# Patient Record
Sex: FEMALE | Race: BLACK OR AFRICAN AMERICAN | Employment: UNEMPLOYED | URBAN - METROPOLITAN AREA
[De-identification: names, ages, dates, MRNs, and addresses within clinical notes are randomized per-mention and may not be internally consistent; named-entity substitution may affect disease eponyms.]

---

## 2017-01-03 ENCOUNTER — TELEPHONE (OUTPATIENT)
Dept: FAMILY MEDICINE CLINIC | Age: 57
End: 2017-01-03

## 2017-01-31 DIAGNOSIS — E55.9 VITAMIN D DEFICIENCY: Primary | ICD-10-CM

## 2017-01-31 DIAGNOSIS — E78.5 HYPERLIPIDEMIA, UNSPECIFIED HYPERLIPIDEMIA TYPE: ICD-10-CM

## 2017-01-31 RX ORDER — EZETIMIBE 10 MG/1
10 TABLET ORAL DAILY
Qty: 30 TAB | Refills: 5 | Status: SHIPPED | OUTPATIENT
Start: 2017-01-31 | End: 2017-10-02 | Stop reason: SDUPTHER

## 2017-01-31 RX ORDER — ERGOCALCIFEROL 1.25 MG/1
50000 CAPSULE ORAL
Qty: 4 CAP | Refills: 2 | Status: SHIPPED | OUTPATIENT
Start: 2017-01-31 | End: 2017-10-03

## 2017-02-03 NOTE — TELEPHONE ENCOUNTER
Notes Recorded by Dolores Cornelius LPN on 3/1/9781 at 8:24 AM  Patient returned call, adv her of zetia and lipitor in tandem with each other. Adv of vitamin d Rx as well as taking with food and vitamin D rich foods. Patient/ Caller given an opportunity to ask questions, repeated information, and verbalized understanding.

## 2017-03-06 ENCOUNTER — HOSPITAL ENCOUNTER (OUTPATIENT)
Dept: MAMMOGRAPHY | Age: 57
Discharge: HOME | End: 2017-03-06
Attending: FAMILY MEDICINE

## 2017-03-06 DIAGNOSIS — Z12.31 VISIT FOR SCREENING MAMMOGRAM: ICD-10-CM

## 2017-07-31 ENCOUNTER — TELEPHONE (OUTPATIENT)
Dept: FAMILY MEDICINE CLINIC | Age: 57
End: 2017-07-31

## 2017-07-31 NOTE — TELEPHONE ENCOUNTER
[phone] spoke to patient needs prescription for Nicotine patches, advised needs office visit patient understand will call me back patient needs to look at her schedule

## 2017-10-02 ENCOUNTER — OFFICE VISIT (OUTPATIENT)
Dept: FAMILY MEDICINE CLINIC | Age: 57
End: 2017-10-02

## 2017-10-02 VITALS
TEMPERATURE: 97.7 F | RESPIRATION RATE: 18 BRPM | DIASTOLIC BLOOD PRESSURE: 81 MMHG | HEART RATE: 81 BPM | BODY MASS INDEX: 30.61 KG/M2 | SYSTOLIC BLOOD PRESSURE: 128 MMHG | HEIGHT: 67 IN | OXYGEN SATURATION: 99 % | WEIGHT: 195 LBS

## 2017-10-02 DIAGNOSIS — Z23 ENCOUNTER FOR IMMUNIZATION: ICD-10-CM

## 2017-10-02 DIAGNOSIS — E03.9 ACQUIRED HYPOTHYROIDISM: ICD-10-CM

## 2017-10-02 DIAGNOSIS — E66.9 OBESITY, CLASS I, BMI 30-34.9: ICD-10-CM

## 2017-10-02 DIAGNOSIS — Z72.0 TOBACCO ABUSE: ICD-10-CM

## 2017-10-02 DIAGNOSIS — G47.9 SLEEP DISTURBANCE: ICD-10-CM

## 2017-10-02 DIAGNOSIS — G89.29 CHRONIC BILATERAL LOW BACK PAIN WITHOUT SCIATICA: ICD-10-CM

## 2017-10-02 DIAGNOSIS — Z12.11 COLON CANCER SCREENING: ICD-10-CM

## 2017-10-02 DIAGNOSIS — G89.29 CHRONIC LEFT SHOULDER PAIN: ICD-10-CM

## 2017-10-02 DIAGNOSIS — E55.9 VITAMIN D DEFICIENCY: ICD-10-CM

## 2017-10-02 DIAGNOSIS — M25.512 CHRONIC LEFT SHOULDER PAIN: ICD-10-CM

## 2017-10-02 DIAGNOSIS — Z12.31 VISIT FOR SCREENING MAMMOGRAM: ICD-10-CM

## 2017-10-02 DIAGNOSIS — I10 ESSENTIAL HYPERTENSION: Primary | ICD-10-CM

## 2017-10-02 DIAGNOSIS — E78.5 HYPERLIPIDEMIA, UNSPECIFIED HYPERLIPIDEMIA TYPE: ICD-10-CM

## 2017-10-02 DIAGNOSIS — F32.A DEPRESSION, UNSPECIFIED DEPRESSION TYPE: ICD-10-CM

## 2017-10-02 DIAGNOSIS — F41.9 ANXIETY: ICD-10-CM

## 2017-10-02 DIAGNOSIS — M54.50 CHRONIC BILATERAL LOW BACK PAIN WITHOUT SCIATICA: ICD-10-CM

## 2017-10-02 DIAGNOSIS — J30.1 CHRONIC ALLERGIC RHINITIS DUE TO POLLEN, UNSPECIFIED SEASONALITY: ICD-10-CM

## 2017-10-02 RX ORDER — IBUPROFEN 200 MG
1 TABLET ORAL EVERY 24 HOURS
Qty: 30 PATCH | Refills: 0 | Status: SHIPPED | OUTPATIENT
Start: 2017-10-02 | End: 2017-10-17 | Stop reason: SDUPTHER

## 2017-10-02 RX ORDER — LEVOTHYROXINE SODIUM 100 UG/1
100 TABLET ORAL
Qty: 90 TAB | Refills: 3 | Status: SHIPPED | OUTPATIENT
Start: 2017-10-02 | End: 2018-11-12 | Stop reason: SDUPTHER

## 2017-10-02 RX ORDER — LISINOPRIL 20 MG/1
20 TABLET ORAL DAILY
Qty: 30 TAB | Refills: 5 | Status: SHIPPED | OUTPATIENT
Start: 2017-10-02 | End: 2018-04-26 | Stop reason: SDUPTHER

## 2017-10-02 RX ORDER — EZETIMIBE 10 MG/1
10 TABLET ORAL DAILY
Qty: 30 TAB | Refills: 5 | Status: SHIPPED | OUTPATIENT
Start: 2017-10-02 | End: 2018-11-12 | Stop reason: SDUPTHER

## 2017-10-02 RX ORDER — ATORVASTATIN CALCIUM 80 MG/1
80 TABLET, FILM COATED ORAL DAILY
Qty: 30 TAB | Refills: 5 | Status: SHIPPED | OUTPATIENT
Start: 2017-10-02 | End: 2018-11-12 | Stop reason: SDUPTHER

## 2017-10-02 RX ORDER — NICOTINE 7MG/24HR
1 PATCH, TRANSDERMAL 24 HOURS TRANSDERMAL EVERY 24 HOURS
Qty: 30 PATCH | Refills: 0 | Status: SHIPPED | OUTPATIENT
Start: 2017-11-02 | End: 2017-12-02

## 2017-10-02 RX ORDER — DULOXETIN HYDROCHLORIDE 30 MG/1
30 CAPSULE, DELAYED RELEASE ORAL DAILY
Qty: 30 CAP | Refills: 5 | Status: SHIPPED | OUTPATIENT
Start: 2017-10-02 | End: 2018-11-12 | Stop reason: SDUPTHER

## 2017-10-02 NOTE — MR AVS SNAPSHOT
Visit Information Date & Time Provider Department Dept. Phone Encounter #  
 10/2/2017  1:45 PM Sandrita Alvarado MD [facility] [phone] [id number] Follow-up Instructions Return in about 6 months (around 4/2/2018), or if symptoms worsen or fail to improve, for hypertension follow up, hyperlipidemia follow up. Upcoming Health Maintenance Date Due DTaP/Tdap/Td series (1 - Tdap) 10/8/1981 PAP AKA CERVICAL CYTOLOGY 10/8/1981 BREAST CANCER SCRN MAMMOGRAM 3/24/2017 FOBT Q 1 YEAR, 18+ 6/27/2017 Allergies as of 10/2/2017  Review Complete On: 10/2/2017 By: Asa Pencil, LPN Severity Noted Reaction Type Reactions Pcn [Penicillins] High 03/24/2010   Systemic Hives, Shortness of Breath, Itching Bactrim [Sulfamethoprim Ds]  10/14/2010    Unable to Obtain Current Immunizations  Reviewed on 12/21/2016 Name Date Influenza Vaccine 10/2/2017 Not reviewed this visit You Were Diagnosed With   
  
 Codes Comments Essential hypertension    -  Primary ICD-10-CM: I10 
ICD-9-CM: 401.9 Tobacco abuse     ICD-10-CM: Z72.0 ICD-9-CM: 305.1 Acquired hypothyroidism     ICD-10-CM: E03.9 ICD-9-CM: 244.9 Hyperlipidemia, unspecified hyperlipidemia type     ICD-10-CM: E78.5 ICD-9-CM: 272.4 Obesity, Class I, BMI 30-34.9     ICD-10-CM: E66.9 ICD-9-CM: 278.00 Vitamin D deficiency     ICD-10-CM: E55.9 ICD-9-CM: 268.9 Encounter for immunization     ICD-10-CM: R85 ICD-9-CM: V03.89 Colon cancer screening     ICD-10-CM: Z12.11 ICD-9-CM: V76.51 Visit for screening mammogram     ICD-10-CM: Z12.31 
ICD-9-CM: V76.12 Chronic bilateral low back pain without sciatica     ICD-10-CM: M54.5, G89.29 ICD-9-CM: 724.2, 338.29 Anxiety     ICD-10-CM: F41.9 ICD-9-CM: 300.00 Depression, unspecified depression type     ICD-10-CM: F32.9 ICD-9-CM: 884 Chronic allergic rhinitis due to pollen, unspecified seasonality     ICD-10-CM: J30.1 ICD-9-CM: 477.0 Chronic left shoulder pain     ICD-10-CM: M25.512, G89.29 ICD-9-CM: 719.41, 338.29 Sleep disturbance     ICD-10-CM: G47.9 ICD-9-CM: 780.50 Vitals BP Pulse Temp Resp Height(growth percentile) Weight(growth percentile) 128/81 (BP 1 Location: Right arm, BP Patient Position: Sitting) 81 97.7 °F (36.5 °C) (Oral) 18 5' 7\" (1.702 m) 195 lb (88.5 kg) SpO2 BMI OB Status Smoking Status 99% 30.54 kg/m2 Hysterectomy Current Every Day Smoker Vitals History BMI and BSA Data Body Mass Index Body Surface Area 30.54 kg/m 2 2.05 m 2 Preferred Pharmacy Pharmacy Name [facility] [address] [place] [address], [address] [place] [address],[address] Floor [phone] Your Updated Medication List  
  
   
This list is accurate as of: 10/2/17  3:43 PM.  Always use your most recent med list.  
  
  
  
  
 atorvastatin 80 mg tablet Commonly known as:  LIPITOR Take 1 Tab by mouth daily. bipap machine kit  
by Does Not Apply route nightly. DULoxetine 30 mg capsule Commonly known as:  CYMBALTA Take 1 Cap by mouth daily. ergocalciferol 50,000 unit capsule Commonly known as:  ERGOCALCIFEROL Take 1 Cap by mouth every seven (7) days. ezetimibe 10 mg tablet Commonly known as:  Ada Boom Take 1 Tab by mouth daily. levothyroxine 100 mcg tablet Commonly known as:  SYNTHROID Take 1 Tab by mouth Daily (before breakfast). lisinopril 20 mg tablet Commonly known as:  Vowinckel Lights Take 1 Tab by mouth daily. Indications: hypertension  
  
 multivitamin tablet Commonly known as:  ONE A DAY Take 1 Tab by mouth daily. * nicotine 14 mg/24 hr patch Commonly known as:  NICODERM CQ  
1 Patch by TransDERmal route every twenty-four (24) hours for 30 days.   
  
 * nicotine 7 mg/24 hr  
Commonly known as:  Jia Dean  
 1 Patch by TransDERmal route every twenty-four (24) hours for 30 days. Start taking on:  2017 VITAMIN D3 1,000 unit tablet Generic drug:  cholecalciferol Take  by mouth daily. vitamin e 1,000 unit capsule Commonly known as:  E GEMS Take 1,000 Units by mouth daily. * Notice: This list has 2 medication(s) that are the same as other medications prescribed for you. Read the directions carefully, and ask your doctor or other care provider to review them with you. Prescriptions Sent to Pharmacy Refills  
 nicotine (NICODERM CQ) 14 mg/24 hr patch 0 Si Patch by TransDERmal route every twenty-four (24) hours for 30 days. Class: Normal  
 Pharmacy: [facility] [address] [place] [address] Ph #: [phone] Route: TransDERmal  
 nicotine (NICODERM CQ) 7 mg/24 hr 0 Starting on: 2017 Si Patch by TransDERmal route every twenty-four (24) hours for 30 days. Class: Normal  
 Pharmacy: [facility] [address] [place] [address],[address] [place] [address] Ph #: [phone] Route: TransDERmal  
 atorvastatin (LIPITOR) 80 mg tablet 5 Sig: Take 1 Tab by mouth daily. Class: Normal  
 Pharmacy: [facility] [address] [place] [address] Ph #: [phone] Route: Oral  
 lisinopril (PRINIVIL, ZESTRIL) 20 mg tablet 5 Sig: Take 1 Tab by mouth daily. Indications: hypertension Class: Normal  
 Pharmacy: [facility] [address] [place] [address],[address] [place] [address] Ph #: [phone] Route: Oral  
 levothyroxine (SYNTHROID) 100 mcg tablet 3 Sig: Take 1 Tab by mouth Daily (before breakfast). Class: Normal  
 Pharmacy: [facility] [address] [place] [address],[address] [place] [address] Ph #: [phone] Route: Oral  
 ezetimibe (ZETIA) 10 mg tablet 5 Sig: Take 1 Tab by mouth daily.   
 Class: Normal  
 Pharmacy: [facility] [address] [place] [address], [address] [facility] Ph #: [phone] Route: Oral  
 DULoxetine (CYMBALTA) 30 mg capsule 5 Sig: Take 1 Cap by mouth daily. Class: Normal  
 Pharmacy: [facility] [address] [place] [address], [address] [facility] Ph #: [phone] Route: Oral  
  
We Performed the Following CBC WITH AUTOMATED DIFF [64770 CPT(R)] INFLUENZA VIRUS VACCINE,(SEASONAL),SPLIT, IN INDIVIDS. >=3 YRS OF AGE, IM [35790 CPT(R)] LIPID PANEL [91099 CPT(R)] METABOLIC PANEL, COMPREHENSIVE [46177 CPT(R)] TSH 3RD GENERATION [54683 CPT(R)] Follow-up Instructions Return in about 6 months (around 4/2/2018), or if symptoms worsen or fail to improve, for hypertension follow up, hyperlipidemia follow up. To-Do List   
 10/02/2017 Imaging:  [facility] MAMMO BI SCREENING INCL CAD   
  
 10/02/2017 Imaging:  XR SHOULDER LT AP/LAT MIN 2 V   
  
 11/01/2017 Lab:  OCCULT BLOOD, IMMUNOASSAY (FIT) Patient Instructions Learning About Obesity What is obesity? Obesity means having so much body fat that your health is in danger. Having too much body fat can lead to type 2 diabetes, heart disease, high blood pressure, arthritis, sleep apnea, and stroke. Even if you don't feel bad now, think about these health risks. Do they seem like a good reason to start on a new path toward a healthier weight? Or do you have another personal, powerful reason for wanting to lose weight? Whatever it is, keep it in mind. It can be hard to change eating habits and exercise habits. But with your own reason and plan, you can do it. How do you know if your weight is in the obesity range? To know if your weight is in the obesity range, your doctor looks at your body mass index (BMI) and waist size. Your BMI is a number that is calculated from your weight and your height.  To figure your BMI for yourself, get a BMI table from your doctor or use an online tool, such as http://www.[place].[facility]/ on the [employer] of [employer]. What causes obesity? When you take in more calories than you burn off, you gain weight. How you eat, how active you are, and other things affect how your body uses calories and whether you gain weight. If you have family members who have too much body fat, you may have inherited a tendency to gain weight. And your family also helps form your eating and lifestyle habits, which can lead to obesity. Also, our busy lives make it harder to plan and cook healthy meals. For many of us, it's easier to reach for prepared foods, go out to eat, or go to the drive-through. But these foods are often high in saturated fat and calories. Portions are often too large. What can you do to reach a healthy weight? Focus on health, not diets. Diets are hard to stay on and don't work in the long run. It is very hard to stay with a diet that includes lots of big changes in your eating habits. Instead of a diet, focus on lifestyle changes that will improve your health and achieve the right balance of energy and calories. To lose weight, you need to burn more calories than you take in. You can do it by eating healthy foods in reasonable amounts and becoming more active, even a little bit every day. Making small changes over time can add up to a lot. Make a plan for change. Many people have found that naming their reasons for change and staying focused on their plan can make a big difference. Work with your doctor to create a plan that is right for you. · Ask yourself: Anastasiia Teixeira are my personal, most powerful reasons for wanting this change? What will my life look like when I've made the change? \" · Set your long-term goal. Make it specific, such as \"I will lose x pounds. \" 
· Break your long-term goal into smaller, short-term goals.  Make these small steps specific and within your reach, things you know you can do. These steps are what keep you going from day to day. How can you stay on your plan for change? Be ready. Choose to start during a time when there are few events that might trigger slip-ups, like holidays, social events, and high-stress periods. Decide on your first few steps. Most people have more success when they make small changes, one step at a time. For example, you might switch a daily candy bar to a piece of fruit, walk 10 minutes more, or add more vegetables to a meal. 
Line up your support people. Make sure you're not going to be alone as you make this change. Connect with people who understand how important it is to you. Ask family members and friends for help in keeping with your plan. And think about who could make it harder for you, and how to handle them. Try tracking. People who keep track of what they eat, feel, and do are better at losing weight. Try writing down things like: · What and how much you eat. · How you feel before and after each meal. 
· Details about each meal (like eating out or at home, eating alone, or with friends or family). · What you do to be active. Look and plan. As you track, look for patterns that you may want to change. Take note of: · When you eat and whether you skip meals. · How often you eat out. · How many fruits and vegetables you eat. · When you eat beyond feeling full. · When and why you eat for reasons other than being hungry. When you stray from your plan, don't get upset. Figure out what made you slip up and how you can fix it. Can you take medicines or have surgery to lose weight? Before your doctor will prescribe medicines or surgery, he or she will probably want you to be more active and follow your healthy eating plan for a period of time. These habits are key lifelong changes for managing your weight, with or without other medical treatment.  And these changes can help you avoid weight-related health problems. Follow-up care is a key part of your treatment and safety. Be sure to make and go to all appointments, and call your doctor if you are having problems. It's also a good idea to know your test results and keep a list of the medicines you take. Where can you learn more? Go to http://marielena-kaley.info/. Enter N111 in the search box to learn more about \"Learning About Obesity. \" Current as of: October 13, 2016 Content Version: 11.3 © 9975-5726 [employer]atch. Care instructions adapted under license by [employer] (which disclaims liability or warranty for this information). If you have questions about a medical condition or this instruction, always ask your healthcare professional. [address] any warranty or liability for your use of this information. Introducing [facility]! Dear Migdalia Russ: Thank you for requesting a Carweez account. Our records indicate that you have previously registered for a Carweez account but its currently inactive. Please call our Car[employer]ez support line at [phone]. Additional Information If you have questions, please visit the Frequently Asked Questions section of the Car[employer]ez website at [email or web address] Remember, Carweez is NOT to be used for urgent needs. For medical emergencies, dial 911. Now available from your iPhone and Android! Please provide this summary of care documentation to your next provider. Your primary care clinician is listed as Ne Pearson. If you have any questions after today's visit, please call [phone].

## 2017-10-02 NOTE — PROGRESS NOTES
Chief Complaint   Patient presents with    Nicotine Dependence     wanting rx for nicotine patches     Medication Refill    Depression    Immunization/Injection       Reviewed Record in preparation for visit and have obtained necessary documentation. Identified pt with two pt identifiers (Name @ )    Health Maintenance Due   Topic    DTaP/Tdap/Td series (1 - Tdap)    PAP AKA CERVICAL CYTOLOGY     BREAST CANCER SCRN MAMMOGRAM     FOBT Q 1 YEAR, 18+     INFLUENZA AGE 9 TO ADULT          1. Have you been to the ER, urgent care clinic since your last visit? Hospitalized since your last visit? No    2. Have you seen or consulted any other health care providers outside of the [address] [place] [address] since your last visit? Include any pap smears or colon screening.  No

## 2017-10-02 NOTE — PATIENT INSTRUCTIONS
Learning About Obesity  What is obesity? Obesity means having so much body fat that your health is in danger. Having too much body fat can lead to type 2 diabetes, heart disease, high blood pressure, arthritis, sleep apnea, and stroke. Even if you don't feel bad now, think about these health risks. Do they seem like a good reason to start on a new path toward a healthier weight? Or do you have another personal, powerful reason for wanting to lose weight? Whatever it is, keep it in mind. It can be hard to change eating habits and exercise habits. But with your own reason and plan, you can do it. How do you know if your weight is in the obesity range? To know if your weight is in the obesity range, your doctor looks at your body mass index (BMI) and waist size. Your BMI is a number that is calculated from your weight and your height. To figure your BMI for yourself, get a BMI table from your doctor or use an online tool, such as http://www.[employer].com/ on the [employer] of [employer]. What causes obesity? When you take in more calories than you burn off, you gain weight. How you eat, how active you are, and other things affect how your body uses calories and whether you gain weight. If you have family members who have too much body fat, you may have inherited a tendency to gain weight. And your family also helps form your eating and lifestyle habits, which can lead to obesity. Also, our busy lives make it harder to plan and cook healthy meals. For many of us, it's easier to reach for prepared foods, go out to eat, or go to the drive-through. But these foods are often high in saturated fat and calories. Portions are often too large. What can you do to reach a healthy weight? Focus on health, not diets. Diets are hard to stay on and don't work in the long run.  It is very hard to stay with a diet that includes lots of big changes in your eating habits. Instead of a diet, focus on lifestyle changes that will improve your health and achieve the right balance of energy and calories. To lose weight, you need to burn more calories than you take in. You can do it by eating healthy foods in reasonable amounts and becoming more active, even a little bit every day. Making small changes over time can add up to a lot. Make a plan for change. Many people have found that naming their reasons for change and staying focused on their plan can make a big difference. Work with your doctor to create a plan that is right for you. · Ask yourself: Sachin Carrillo are my personal, most powerful reasons for wanting this change? What will my life look like when I've made the change? \"  · Set your long-term goal. Make it specific, such as \"I will lose x pounds. \"  · Break your long-term goal into smaller, short-term goals. Make these small steps specific and within your reach, things you know you can do. These steps are what keep you going from day to day. How can you stay on your plan for change? Be ready. Choose to start during a time when there are few events that might trigger slip-ups, like holidays, social events, and high-stress periods. Decide on your first few steps. Most people have more success when they make small changes, one step at a time. For example, you might switch a daily candy bar to a piece of fruit, walk 10 minutes more, or add more vegetables to a meal.  Line up your support people. Make sure you're not going to be alone as you make this change. Connect with people who understand how important it is to you. Ask family members and friends for help in keeping with your plan. And think about who could make it harder for you, and how to handle them. Try tracking. People who keep track of what they eat, feel, and do are better at losing weight. Try writing down things like:  · What and how much you eat.   · How you feel before and after each meal.  · Details about each meal (like eating out or at home, eating alone, or with friends or family). · What you do to be active. Look and plan. As you track, look for patterns that you may want to change. Take note of:  · When you eat and whether you skip meals. · How often you eat out. · How many fruits and vegetables you eat. · When you eat beyond feeling full. · When and why you eat for reasons other than being hungry. When you stray from your plan, don't get upset. Figure out what made you slip up and how you can fix it. Can you take medicines or have surgery to lose weight? Before your doctor will prescribe medicines or surgery, he or she will probably want you to be more active and follow your healthy eating plan for a period of time. These habits are key lifelong changes for managing your weight, with or without other medical treatment. And these changes can help you avoid weight-related health problems. Follow-up care is a key part of your treatment and safety. Be sure to make and go to all appointments, and call your doctor if you are having problems. It's also a good idea to know your test results and keep a list of the medicines you take. Where can you learn more? Go to http://marielena-kaley.info/. Enter N111 in the search box to learn more about \"Learning About Obesity. \"  Current as of: October 13, 2016  Content Version: 11.3  © 0347-0938 [employer]. Care instructions adapted under license by [employer] (which disclaims liability or warranty for this information). If you have questions about a medical condition or this instruction, always ask your healthcare professional. [address] any warranty or liability for your use of this information.

## 2017-10-02 NOTE — PROGRESS NOTES
HISTORY OF PRESENT ILLNESS  Sylwia Escobar is a 64 y.o. female. Blood pressure 128/81, pulse 81, temperature 97.7 °F (36.5 °C), temperature source Oral, resp. rate 18, height 5' 7\" (1.702 m), weight 195 lb (88.5 kg), SpO2 99 %. Body mass index is 30.54 kg/(m^2). Chief Complaint   Patient presents with    Nicotine Dependence     wanting rx for nicotine patches     Medication Refill    Depression    Immunization/Injection        HPI   Sylwia Escobar 64 y.o. female  presents to the office today for follow up on chronic conditions. Hypertension: Bp at office today 128/81. Pt continues with Lisinopril 20mg daily. Bp control stable, continue current regimen. Pt states that she does not take it regularly at home. Pt reports that she has been under a lot of stress recently with class at night and taking care of her mother. Pt denies chest pain, palpitations, dizziness, shortness of breath. Chronic right hip pain: Pt reports that her pain is well controlled and is just stiff in the mornings. Pt reports that she will take 2 Extra strenght tylenols a day when she needs it with relief. Stable, continue current regimen. Redness in right ankle: Pt reports redness in right ankle and believes that it is related to vericose veins noticed 6 months ago. Pt denies any pain. Left shoulder pain: Pt reports that she has a burning, pulling, sticking pain in her left shoulder. Pt reports limited mobility in her shoulder and pain when sleeping. Pt reports that she has had some similar issues with her right shoulder and was told it could be her rotator cuff. I advised pt that I believe it is a partial rotator cuff tear. I advised pt that I will get an XR of her left shoulder to rule out anything abnormal.     Hyperlipidemia: Lipid panel on 12/21/16 notable for total cholesterol 337, , HDL 61, and triglycerides 193. Pt continues with Atorvastatin 80mg daily. Presumed stable, will assess levels today. Depression: Pt reports that she does not feel depressed just has increased stress. Pt has asked for a nicotine replacement. Pt reports that she smokes 4-6 cigarettes a day. Previously quit, but with new stresses has started again. Pt has asked for some nicotine patches to help her quit again. I advised pt to try the 14mg patches for 3 weeks and then the 7mg patches for another 3 weeks. Sleep apnea: Pt states that she uses her CPAP machine nightly. Pt reports some trouble falling asleep. I have advised pt on some sleep hygrine to help her fall asleep better without medications. Hypothyroidism: Pt's TSH levels were 0.333 on 12/22/16. Pt continues with Levothyroxine 100mcg daily. Presumed stable, will assess levels today. Health Maintenance: Pt has received influenza vaccine today in office. Pt reports some nasal congestion and believes that it is seasonal. I advised pt to try taking Flonase to help alleviate her symptoms. Pt's last Vitamin D levels were 11.7 on 12/22/17. Pt continues with 1000 units of Vitamin D daily. Presumed stable, will assess levels today. I advised pt that she is due for a mammogram. Pt completed a FIT test last year and states that she has no one that can take her to get a colonoscopy. I have advised pt to completed another FIT test to rule out fecal occult stool. Current Outpatient Prescriptions   Medication Sig Dispense Refill    nicotine (NICODERM CQ) 14 mg/24 hr patch 1 Patch by TransDERmal route every twenty-four (24) hours for 30 days. 30 Patch 0    [START ON 11/2/2017] nicotine (NICODERM CQ) 7 mg/24 hr 1 Patch by TransDERmal route every twenty-four (24) hours for 30 days. 30 Patch 0    atorvastatin (LIPITOR) 80 mg tablet Take 1 Tab by mouth daily. 30 Tab 5    lisinopril (PRINIVIL, ZESTRIL) 20 mg tablet Take 1 Tab by mouth daily. Indications: hypertension 30 Tab 5    levothyroxine (SYNTHROID) 100 mcg tablet Take 1 Tab by mouth Daily (before breakfast).  90 Tab 3    ezetimibe (ZETIA) 10 mg tablet Take 1 Tab by mouth daily. 30 Tab 5    DULoxetine (CYMBALTA) 30 mg capsule Take 1 Cap by mouth daily. 30 Cap 5    bipap machine kit by Does Not Apply route nightly.  vitamin e (E GEMS) 1,000 unit capsule Take 1,000 Units by mouth daily.  cholecalciferol (VITAMIN D3) 1,000 unit tablet Take  by mouth daily.  multivitamin (ONE A DAY) tablet Take 1 Tab by mouth daily.  ergocalciferol (ERGOCALCIFEROL) 50,000 unit capsule Take 1 Cap by mouth every seven (7) days. 4 Cap 2     Allergies   Allergen Reactions    Pcn [Penicillins] Hives, Shortness of Breath and Itching    Bactrim [Sulfamethoprim Ds] Unable to Obtain     Past Medical History:   Diagnosis Date    AC (acromioclavicular) joint bone spurs     Acid reflux     Anemia NEC     Arthritis     right arm     Cancer (HCC)     thyroid    Chronic pain     Depression     High cholesterol     Incontinence of urine     MDD (major depressive disorder)     Multiple thyroid nodules     Other ill-defined conditions(799.89)     right shoulder pain    Sleep apnea 2013    Thyroid disease      Past Surgical History:   Procedure Laterality Date    HX  SECTION      HX HERNIA REPAIR      HX HYSTERECTOMY       Family History   Problem Relation Age of Onset    Hypertension Mother    Jeff Kennedy Arthritis-rheumatoid Mother     Cancer Father      colon    Diabetes Father     Cancer Sister      breast cancer    Diabetes Sister     Thyroid Disease Sister      thyroid cancer    Hypertension Brother     Cancer Brother      colon    Cancer Sister      thyroid    Seizures Sister     Cancer Paternal Grandfather      prostate      Social History   Substance Use Topics    Smoking status: Current Every Day Smoker     Years: 30.00     Last attempt to quit: 2013    Smokeless tobacco: Never Used      Comment: 5 cigs per day    Alcohol use No        Review of Systems   Constitutional: Negative.   Negative for chills, fever, malaise/fatigue and weight loss. HENT: Positive for congestion. Eyes: Negative for blurred vision. Respiratory: Negative for shortness of breath and wheezing. Cardiovascular: Negative for chest pain and leg swelling. Gastrointestinal: Negative. Genitourinary: Negative. Musculoskeletal: Positive for joint pain (Right hip and left shoulder). Neurological: Negative for dizziness and headaches. Trouble falling asleep. Psychiatric/Behavioral: Negative for depression. Stressed   All other systems reviewed and are negative. Physical Exam   Constitutional: She is oriented to person, place, and time. She appears well-developed and well-nourished. No distress. HENT:   Head: Normocephalic and atraumatic. Nose: Rhinorrhea present. Eyes: Pupils are equal, round, and reactive to light. Neck: Trachea normal. Carotid bruit is not present. No thyromegaly present. Cardiovascular: Normal rate, regular rhythm, normal heart sounds and intact distal pulses. Exam reveals no gallop and no friction rub. No murmur heard. Pulmonary/Chest: Effort normal and breath sounds normal. No respiratory distress. She has no wheezes. She has no rales. Musculoskeletal: She exhibits no edema. Left shoulder: She exhibits decreased range of motion and pain. She exhibits normal strength. Loza Neer positive of left shoulder. Neurological: She is alert and oriented to person, place, and time. Skin: She is not diaphoretic. Psychiatric: Her behavior is normal. Judgment and thought content normal. Her mood appears anxious. She does not exhibit a depressed mood. Nursing note and vitals reviewed. ASSESSMENT and PLAN  Diagnoses and all orders for this visit:    1. Essential hypertension  -     CBC WITH AUTOMATED DIFF  -     METABOLIC PANEL, COMPREHENSIVE  -     lisinopril (PRINIVIL, ZESTRIL) 20 mg tablet; Take 1 Tab by mouth daily.  Indications: hypertension  - Bp control stable, continue current regimen. 2. Tobacco abuse  -     nicotine (NICODERM CQ) 14 mg/24 hr patch; 1 Patch by TransDERmal route every twenty-four (24) hours for 30 days. -     nicotine (NICODERM CQ) 7 mg/24 hr; 1 Patch by TransDERmal route every twenty-four (24) hours for 30 days. - The patient was counseled on the dangers of tobacco use, and was advised to quit. Reviewed strategies to maximize success, including removing cigarettes and smoking materials from environment. - I advised pt to try the 14mg patches for 3 weeks and then the 7mg patches for another 3 weeks. 3. Acquired hypothyroidism  -     TSH 3RD GENERATION  -     levothyroxine (SYNTHROID) 100 mcg tablet; Take 1 Tab by mouth Daily (before breakfast). - Pt's TSH levels were 0.333 on 12/22/16. Pt continues with Levothyroxine 100mcg daily. Presumed stable, will assess levels today. 4. Hyperlipidemia, unspecified hyperlipidemia type  -     LIPID PANEL  -     atorvastatin (LIPITOR) 80 mg tablet; Take 1 Tab by mouth daily. -     ezetimibe (ZETIA) 10 mg tablet; Take 1 Tab by mouth daily.  - Lipid panel on 12/21/16 notable for total cholesterol 337, , HDL 61, and triglycerides 193. Pt continues with Atorvastatin 80mg daily. Presumed stable, will assess levels today. 5. Obesity, Class I, BMI 30-34.9        - I have reviewed/discussed the above normal BMI with the patient. I have recommended the following interventions: dietary management education, guidance, and counseling, encourage exercise and monitor weight . 6. Vitamin D deficiency  -     VITAMIN D, 25 HYROXY PANEL  -  Pt's last Vitamin D levels were 11.7 on 12/22/17. Pt continues with 1000 units of Vitamin D daily. Presumed stable, will assess levels today. 7. Encounter for immunization  -     Influenza virus vaccine (SEASONAL) 3 years and older, IM (38762)    8. Colon cancer screening  -     OCCULT BLOOD, IMMUNOASSAY (FIT); Future    9.  Visit for screening mammogram  -     [facility] MAMMO BI SCREENING INCL CAD; Future    10. Chronic bilateral low back pain without sciatica  -     DULoxetine (CYMBALTA) 30 mg capsule; Take 1 Cap by mouth daily.  - Stable, continue current regimen. 11. Anxiety  -     DULoxetine (CYMBALTA) 30 mg capsule; Take 1 Cap by mouth daily.  - Stable, continue current regimen. 12. Depression, unspecified depression type  -     DULoxetine (CYMBALTA) 30 mg capsule; Take 1 Cap by mouth daily.  - Stable, continue current regimen. 13. Chronic allergic rhinitis due to pollen, unspecified seasonality         -  I advised pt to try taking Flonase or OTC allergy medications to help alleviate her symptoms. 14. Chronic left shoulder pain  -     XR SHOULDER LT AP/LAT MIN 2 V; Future        - I advised pt that I believe it is a partial rotator cuff tear. I advised pt that I will get an XR of her left shoulder to rule out anything abnormal.     15. Sleep disturbance        -  I have advised pt on some sleep hygrine to help her fall asleep better without medications. Follow-up Disposition:  Return in about 6 months (around 4/2/2018), or if symptoms worsen or fail to improve, for hypertension follow up, hyperlipidemia follow up. Medication risks/benefits/costs/interactions/alternatives discussed with patient. Advised patient to call back or return to office if symptoms worsen/change/persist.  If patient cannot reach us or should anything more severe/urgent arise he/she should proceed directly to the nearest emergency department. Discussed expected course/resolution/complications of diagnosis in detail with patient. Patient given a written after visit summary which includes her diagnoses, current medications and vitals. Patient expressed understanding with the diagnosis and plan.   Written by paulina Arteaga, as dictated by, Dr. Grisel Brown MD.

## 2017-10-03 ENCOUNTER — TELEPHONE (OUTPATIENT)
Dept: FAMILY MEDICINE CLINIC | Age: 57
End: 2017-10-03

## 2017-10-03 DIAGNOSIS — M25.512 ACUTE PAIN OF LEFT SHOULDER: Primary | ICD-10-CM

## 2017-10-03 NOTE — TELEPHONE ENCOUNTER
Patient is calling in regards to her most recent XRAY that was performed she would like a call back with the results as soon as possible.     Best call back # for [id number]:[phone]

## 2017-10-06 ENCOUNTER — TELEPHONE (OUTPATIENT)
Dept: FAMILY MEDICINE CLINIC | Age: 57
End: 2017-10-06

## 2017-10-06 NOTE — TELEPHONE ENCOUNTER
[phone] spoke to patient advised its the  from [facility] called her to set up her MRI and Mammogram gave her  [phone] to call them and set up appointment patient understand

## 2017-10-06 NOTE — TELEPHONE ENCOUNTER
----- Message from Shelton Virgen sent at 10/6/2017  2:23 PM EDT -----  Regarding: /Telephone  Would a nurse to return call    Pt was referred to get a MRI. A lady by the name of Annamarie Mejia called her but the number Hellenomarmasood Roberto left is out of service. The pt would like the contact information and name of where she is to have the MRI done, and would also like to know the two test that was ordered.      Best contact is [phone]

## 2017-10-17 ENCOUNTER — TELEPHONE (OUTPATIENT)
Dept: FAMILY MEDICINE CLINIC | Age: 57
End: 2017-10-17

## 2017-10-17 DIAGNOSIS — Z72.0 TOBACCO ABUSE: ICD-10-CM

## 2017-10-17 NOTE — TELEPHONE ENCOUNTER
Spoke to patient per patient she is using nicotine 7 mg she was supposed to get 14 mg but she was given 7 mg    [phone] called [facility] to verify per Jah Sim didn't get the prescription for Nicotine 14 mg they only got Nicotine 7 mg can you resend Nicotine 14 mg

## 2017-10-17 NOTE — TELEPHONE ENCOUNTER
[phone] per Migdalia Russ 5-10 per day before nicotine, after using nicotine 7 mg she's smoke 10 per day now because nicotine 7 mg not working pharmacy gave her Nicotine 7 instead of 14 mg

## 2017-10-17 NOTE — TELEPHONE ENCOUNTER
[phone] spoke to patient per patient Nicotine patches not working for wants to know if she can increase it or need another medication.

## 2017-10-17 NOTE — TELEPHONE ENCOUNTER
----- Message from Cristy Diaz sent at 10/17/2017  9:52 AM EDT -----  Regarding: /telephone  Pt is requesting a call back from the practice in regards to RX (nicotene patches). Best contact [phone].

## 2017-10-19 RX ORDER — IBUPROFEN 200 MG
1 TABLET ORAL EVERY 24 HOURS
Qty: 30 PATCH | Refills: 2 | Status: SHIPPED | OUTPATIENT
Start: 2017-10-19 | End: 2017-11-18

## 2017-11-06 ENCOUNTER — APPOINTMENT (OUTPATIENT)
Dept: MRI IMAGING | Age: 57
End: 2017-11-06
Attending: FAMILY MEDICINE
Payer: MEDICARE

## 2017-11-06 ENCOUNTER — HOSPITAL ENCOUNTER (OUTPATIENT)
Dept: LAB | Age: 57
Discharge: HOME | End: 2017-11-06
Payer: MEDICARE

## 2017-11-06 ENCOUNTER — HOSPITAL ENCOUNTER (OUTPATIENT)
Dept: MAMMOGRAPHY | Age: 57
Discharge: HOME | End: 2017-11-06
Attending: FAMILY MEDICINE
Payer: MEDICARE

## 2017-11-06 DIAGNOSIS — Z12.11 COLON CANCER SCREENING: ICD-10-CM

## 2017-11-06 DIAGNOSIS — Z12.31 VISIT FOR SCREENING MAMMOGRAM: ICD-10-CM

## 2017-11-06 PROCEDURE — 84443 ASSAY THYROID STIM HORMONE: CPT

## 2017-11-06 PROCEDURE — 82306 VITAMIN D 25 HYDROXY: CPT

## 2017-11-06 PROCEDURE — 36415 COLL VENOUS BLD VENIPUNCTURE: CPT

## 2017-11-06 PROCEDURE — 85025 COMPLETE CBC W/AUTO DIFF WBC: CPT

## 2017-11-06 PROCEDURE — 80061 LIPID PANEL: CPT

## 2017-11-06 PROCEDURE — 80053 COMPREHEN METABOLIC PANEL: CPT

## 2017-11-06 PROCEDURE — 77067 SCR MAMMO BI INCL CAD: CPT

## 2017-11-06 PROCEDURE — 82270 OCCULT BLOOD FECES: CPT

## 2017-11-07 LAB — HEMOCCULT STL QL IA: POSITIVE

## 2017-11-08 ENCOUNTER — TELEPHONE (OUTPATIENT)
Dept: FAMILY MEDICINE CLINIC | Age: 57
End: 2017-11-08

## 2017-11-08 NOTE — PROGRESS NOTES
Pt FIT test positive  She needs to see GI ASAP  Family history of colon cancer    Refer to Dr. Iliana Lozada please

## 2017-11-08 NOTE — TELEPHONE ENCOUNTER
Gaby Bush  Best# [phone] after 3 pm Monday ( 11/13/2017) had MRI on left shoulder in a lot of pain to pioint I can not sleep. From Ear to my fingers.  Can he call me something in or do I have to come in Tylenol is not working

## 2017-11-08 NOTE — PROGRESS NOTES
[phone] verified  Patient notified of above note and voiced understanding of what was read.    Called Dr Iliana Lozada office [phone] had appointment with Dr Forbes Dubin 2017 at 10:45A but patient said she couldn't make it due to she has class  Next appointment 2017 with is a Thursday and per patient she can't do Thursday so Dr Iliana Lozada office advised me to let the patient call them because of scheduling [phone] notified patient to call Dr Iliana Lozada office directly patient understand

## 2017-11-10 LAB
25(OH)D2 SERPL-MCNC: 11 NG/ML
25(OH)D3 SERPL-MCNC: 27 NG/ML
25(OH)D3+25(OH)D2 SERPL-MCNC: 38 NG/ML
BASOPHILS # BLD AUTO: NORMAL 10*3/UL
EOSINOPHIL # BLD AUTO: NORMAL 10*3/UL
EOSINOPHIL NFR BLD AUTO: NORMAL %
HCT VFR BLD AUTO: NORMAL %
HGB BLD-MCNC: NORMAL G/DL
LYMPHOCYTES # BLD AUTO: NORMAL 10*3/UL
LYMPHOCYTES NFR BLD AUTO: NORMAL %
MONOCYTES NFR BLD AUTO: NORMAL %
NEUTROPHILS NFR BLD AUTO: NORMAL %
PLATELET # BLD AUTO: NORMAL 10*3/UL
RBC # BLD AUTO: NORMAL 10*6/UL
WBC # BLD AUTO: NORMAL X10E3/UL

## 2017-11-13 ENCOUNTER — TELEPHONE (OUTPATIENT)
Dept: FAMILY MEDICINE CLINIC | Age: 57
End: 2017-11-13

## 2017-11-13 ENCOUNTER — HOSPITAL ENCOUNTER (OUTPATIENT)
Dept: MRI IMAGING | Age: 57
Discharge: HOME | End: 2017-11-13
Attending: FAMILY MEDICINE

## 2017-11-13 DIAGNOSIS — M25.512 ACUTE PAIN OF LEFT SHOULDER: ICD-10-CM

## 2017-11-13 NOTE — TELEPHONE ENCOUNTER
Dot Malagon   -   [phone]        Patient went in  for a MRI got Claustrophobic and would not go thru with it-  Was offered a sedative but  Did not have anyone pick her up afterwards -  Requesting a call from nurse -

## 2017-11-14 RX ORDER — DIAZEPAM 5 MG/1
TABLET ORAL
Qty: 1 TAB | Refills: 0 | OUTPATIENT
Start: 2017-11-14 | End: 2019-10-02

## 2017-11-14 RX ORDER — DIAZEPAM 5 MG/1
5 TABLET ORAL
COMMUNITY
End: 2017-11-14 | Stop reason: SDUPTHER

## 2017-11-14 NOTE — TELEPHONE ENCOUNTER
[phone] notified patient will call in prescription for valium to take 1 hour prior to MRI procedure patient understand     [phone] [facility] called in prescription for valium 5 mg take 1 tablet by mouth 1 hour prior to MRI procedure via

## 2017-11-14 NOTE — TELEPHONE ENCOUNTER
[phone] spoke to patient she had to cancel MRI due to Claustrophobic but she wants it set up again for Friday since she will have transportation to take pick her/drop her off since they will give her sedative to calm her down for MRI. Patient can only do Friday 9-12 due to transportation      [facility] set up appointment for 12/1/2017 at 10:45AM     [phone] I advised Blance of the appointment I also advised her that will send message to Dr Kp Rodriguez to see if we can get her sedative to calm her down for MRI she's aware and will let her know.

## 2017-11-30 ENCOUNTER — TELEPHONE (OUTPATIENT)
Dept: FAMILY MEDICINE CLINIC | Age: 57
End: 2017-11-30

## 2017-11-30 NOTE — TELEPHONE ENCOUNTER
[phone] spoke to patient advised can take valium 1 hour prior to MRI and advised we can set her up with Jaimie Camacho to discuss her mental issues per patient will call back to set appointment

## 2017-12-01 ENCOUNTER — HOSPITAL ENCOUNTER (OUTPATIENT)
Dept: MRI IMAGING | Age: 57
Discharge: HOME | End: 2017-12-01
Attending: FAMILY MEDICINE
Payer: MEDICARE

## 2017-12-01 DIAGNOSIS — M25.512 PAIN IN LEFT SHOULDER: ICD-10-CM

## 2017-12-01 PROCEDURE — 73221 MRI JOINT UPR EXTREM W/O DYE: CPT

## 2017-12-03 NOTE — PROGRESS NOTES
Please inquire where the pts other labs are 2/5 have come back  Also she has blood in stool needs to see GI

## 2017-12-03 NOTE — PROGRESS NOTES
Impression             IMPRESSION:   1. Mild myotendinous strain of the lateral head of the deltoid  2.  Mild tendinopathy of the supraspinatus infraspinatus without full-thickness    Needs to have PT with Kolleen Cardinal  tear

## 2017-12-04 ENCOUNTER — TELEPHONE (OUTPATIENT)
Dept: FAMILY MEDICINE CLINIC | Age: 57
End: 2017-12-04

## 2017-12-04 DIAGNOSIS — M25.512 ACUTE PAIN OF LEFT SHOULDER: Primary | ICD-10-CM

## 2017-12-04 NOTE — TELEPHONE ENCOUNTER
----- Message from Angela Hartmann sent at 12/4/2017 12:40 PM EST -----  Regarding: Dr. Geovanna Cragi  Patient would like the results of her MRI. Her number is [phone] or [phone].

## 2017-12-04 NOTE — TELEPHONE ENCOUNTER
[phone] spoke to patient notified of her MRI results gave her PT information     Per patient can she get something for pain she can't sleep at night

## 2017-12-04 NOTE — TELEPHONE ENCOUNTER
----- Message from Constance Casanova MD sent at 12/2/2017  7:50 PM EST -----  Impression             IMPRESSION:   1. Mild myotendinous strain of the lateral head of the deltoid  2.  Mild tendinopathy of the supraspinatus infraspinatus without full-thickness    Needs to have PT with Girma Nageotte  tear

## 2017-12-05 NOTE — TELEPHONE ENCOUNTER
[phone] attempted to call patient no answer left message on her private VM of Dr Lima Ventura anti inflamatory motrin 400-600 mg every 6-8 hours as needed for pain x 7-10 days recommendation and to call me back if she has question

## 2017-12-05 NOTE — TELEPHONE ENCOUNTER
I would advice an anti inflamatory motrin 400-600 mg every 6-8 hours as needed for pain x7-10 days max

## 2017-12-22 ENCOUNTER — DOCUMENTATION ONLY (OUTPATIENT)
Dept: FAMILY MEDICINE CLINIC | Age: 57
End: 2017-12-22

## 2017-12-29 ENCOUNTER — APPOINTMENT (OUTPATIENT)
Dept: PHYSICAL THERAPY | Age: 57
End: 2017-12-29

## 2018-01-24 ENCOUNTER — TELEPHONE (OUTPATIENT)
Dept: FAMILY MEDICINE CLINIC | Age: 58
End: 2018-01-24

## 2018-01-24 NOTE — TELEPHONE ENCOUNTER
[phone] spoke to patient advised that she was refer to PT per patient she can't even move she's been dealing with pain needs something for pain tylenol and aleve is making her stomach upset advised patient will send message to Dr Kerman Simmonds

## 2018-01-24 NOTE — TELEPHONE ENCOUNTER
Patient advised that she is still having pain in her legs and her rotators cuff. She has been taking pain medicine (Tylenol and Aleve, but is medication is making her stomach upset. Would like a nurse to call her back. Best contact info [phone].

## 2018-01-26 NOTE — TELEPHONE ENCOUNTER
[phone] notified patient via private  notified needs to see pain management per Dr Weaver Catching Dr Kaylan Bull   [phone]

## 2018-05-14 DIAGNOSIS — I10 ESSENTIAL HYPERTENSION: ICD-10-CM

## 2018-05-14 RX ORDER — LISINOPRIL 20 MG/1
20 TABLET ORAL DAILY
Qty: 30 TAB | Refills: 0 | Status: CANCELLED | OUTPATIENT
Start: 2018-05-14

## 2018-05-14 NOTE — TELEPHONE ENCOUNTER
Refill 90 day    Requested Prescriptions     Pending Prescriptions Disp Refills    lisinopril (PRINIVIL, ZESTRIL) 20 mg tablet 30 Tab 0     Sig: Take 1 Tab by mouth daily.  Indications: hypertension

## 2018-05-14 NOTE — TELEPHONE ENCOUNTER
Called to patient. александр verified. Informed patient that I received a 90 day request for lisinopril but unfortunately she is overdue and we cannot give that much at this time. She states she will call to make an appointment when she knows her availability.

## 2018-05-15 NOTE — TELEPHONE ENCOUNTER
Called patient and lm that she needs to call us and make apt. Before further refills. Past due for apt.

## 2018-08-20 DIAGNOSIS — I10 ESSENTIAL HYPERTENSION: ICD-10-CM

## 2018-08-20 NOTE — TELEPHONE ENCOUNTER
Pharmacy requesting medication refill 90day supply     Requested Prescriptions     Pending Prescriptions Disp Refills    lisinopril (PRINIVIL, ZESTRIL) 20 mg tablet 30 Tab 0     Sig: Take 1 Tab by mouth daily.  Indications: hypertension     Pharmacy on file verified

## 2018-08-20 NOTE — TELEPHONE ENCOUNTER
LOV 10/2/2017  Patient was aware 5/14/2018 Mahesh Saucedo notified patient that needs office visit for 90 day supply

## 2018-08-23 RX ORDER — LISINOPRIL 20 MG/1
20 TABLET ORAL DAILY
Qty: 30 TAB | Refills: 0 | Status: SHIPPED | OUTPATIENT
Start: 2018-08-23 | End: 2018-10-31 | Stop reason: SDUPTHER

## 2018-10-18 ENCOUNTER — TELEPHONE (OUTPATIENT)
Dept: FAMILY MEDICINE CLINIC | Age: 58
End: 2018-10-18

## 2018-10-18 NOTE — TELEPHONE ENCOUNTER
Patient is calling in regards to getting an appointment but would like to talk with the nurse first. Also in regards to her mom. Patient would like a call back after 3 p.m. Today.     Best call back : [phone]    LOV: October 2, 2017

## 2018-10-18 NOTE — TELEPHONE ENCOUNTER
[phone] spoke to patient needs assistance with Mom Fernando Rojas advised her we need to see patient we have not seen here for 2 years Rere understand.  Rere wants to set up appointment for herself advised 11/12/2018 at 10:30A Rere will call me back to set up appointment for Mom later

## 2018-10-31 DIAGNOSIS — I10 ESSENTIAL HYPERTENSION: ICD-10-CM

## 2018-10-31 NOTE — TELEPHONE ENCOUNTER
Pharm on file verified. Last office visit 12/22/17  Last refill 08/23/18    Requested Prescriptions     Pending Prescriptions Disp Refills    lisinopril (PRINIVIL, ZESTRIL) 20 mg tablet 30 Tab 0     Sig: Take 1 Tab by mouth daily.

## 2018-11-04 RX ORDER — LISINOPRIL 20 MG/1
20 TABLET ORAL DAILY
Qty: 30 TAB | Refills: 0 | Status: SHIPPED | OUTPATIENT
Start: 2018-11-04 | End: 2018-12-27 | Stop reason: SDUPTHER

## 2018-11-12 ENCOUNTER — OFFICE VISIT (OUTPATIENT)
Dept: FAMILY MEDICINE CLINIC | Age: 58
End: 2018-11-12

## 2018-11-12 VITALS
DIASTOLIC BLOOD PRESSURE: 78 MMHG | SYSTOLIC BLOOD PRESSURE: 109 MMHG | WEIGHT: 189.6 LBS | BODY MASS INDEX: 29.76 KG/M2 | HEART RATE: 66 BPM | TEMPERATURE: 97.6 F | RESPIRATION RATE: 18 BRPM | OXYGEN SATURATION: 98 % | HEIGHT: 67 IN

## 2018-11-12 DIAGNOSIS — E66.3 OVERWEIGHT: ICD-10-CM

## 2018-11-12 DIAGNOSIS — J06.9 VIRAL URI: ICD-10-CM

## 2018-11-12 DIAGNOSIS — M54.50 CHRONIC BILATERAL LOW BACK PAIN WITHOUT SCIATICA: ICD-10-CM

## 2018-11-12 DIAGNOSIS — E03.9 ACQUIRED HYPOTHYROIDISM: ICD-10-CM

## 2018-11-12 DIAGNOSIS — E78.5 HYPERLIPIDEMIA, UNSPECIFIED HYPERLIPIDEMIA TYPE: Primary | ICD-10-CM

## 2018-11-12 DIAGNOSIS — Z13.31 SCREENING FOR DEPRESSION: ICD-10-CM

## 2018-11-12 DIAGNOSIS — F32.A DEPRESSION, UNSPECIFIED DEPRESSION TYPE: ICD-10-CM

## 2018-11-12 DIAGNOSIS — Z12.11 COLON CANCER SCREENING: ICD-10-CM

## 2018-11-12 DIAGNOSIS — Z00.00 ENCOUNTER FOR MEDICARE ANNUAL WELLNESS EXAM: ICD-10-CM

## 2018-11-12 DIAGNOSIS — G89.29 CHRONIC BILATERAL LOW BACK PAIN WITHOUT SCIATICA: ICD-10-CM

## 2018-11-12 DIAGNOSIS — F41.9 ANXIETY: ICD-10-CM

## 2018-11-12 RX ORDER — LEVOTHYROXINE SODIUM 100 UG/1
100 TABLET ORAL
Qty: 90 TAB | Refills: 1 | Status: SHIPPED | OUTPATIENT
Start: 2018-11-12 | End: 2019-02-15 | Stop reason: DRUGHIGH

## 2018-11-12 RX ORDER — ATORVASTATIN CALCIUM 80 MG/1
80 TABLET, FILM COATED ORAL DAILY
Qty: 30 TAB | Refills: 5 | Status: SHIPPED | OUTPATIENT
Start: 2018-11-12 | End: 2019-04-10 | Stop reason: SDUPTHER

## 2018-11-12 RX ORDER — EZETIMIBE 10 MG/1
10 TABLET ORAL DAILY
Qty: 30 TAB | Refills: 5 | Status: SHIPPED | OUTPATIENT
Start: 2018-11-12 | End: 2019-05-08 | Stop reason: SDUPTHER

## 2018-11-12 RX ORDER — DULOXETIN HYDROCHLORIDE 30 MG/1
30 CAPSULE, DELAYED RELEASE ORAL DAILY
Qty: 30 CAP | Refills: 5 | Status: SHIPPED | OUTPATIENT
Start: 2018-11-12 | End: 2019-10-02 | Stop reason: SDUPTHER

## 2018-11-12 NOTE — PROGRESS NOTES
This is the Subsequent Medicare Annual Wellness Exam, performed 12 months or more after the Initial AWV or the last Subsequent AWV I have reviewed the patient's medical history in detail and updated the computerized patient record. History Past Medical History:  
Diagnosis Date  AC (acromioclavicular) joint bone spurs  Acid reflux  Anemia NEC  Arthritis   
 right arm  Cancer ([facility] [address] thyroid  Chronic pain  Depression  High cholesterol  Incontinence of urine  MDD (major depressive disorder) 2013  Multiple thyroid nodules  Other ill-defined conditions(799.89) right shoulder pain  Sleep apnea 2013  Thyroid disease Past Surgical History:  
Procedure Laterality Date  HX BREAST BIOPSY Right 2009 US; neg [address]  HX HERNIA REPAIR    
 HX HYSTERECTOMY Current Outpatient Medications Medication Sig Dispense Refill  lisinopril (PRINIVIL, ZESTRIL) 20 mg tablet Take 1 Tab by mouth daily. 30 Tab 0  
 diazePAM (VALIUM) 5 mg tablet Take 5 mg 1 hour prior to MRI procedure Patient has MRI 12/1/2017 1 Tab 0  
 atorvastatin (LIPITOR) 80 mg tablet Take 1 Tab by mouth daily. 30 Tab 5  levothyroxine (SYNTHROID) 100 mcg tablet Take 1 Tab by mouth Daily (before breakfast). 90 Tab 3  
 ezetimibe (ZETIA) 10 mg tablet Take 1 Tab by mouth daily. 30 Tab 5  DULoxetine (CYMBALTA) 30 mg capsule Take 1 Cap by mouth daily. 30 Cap 5  
 bipap machine kit by Does Not Apply route nightly.  vitamin e (E GEMS) 1,000 unit capsule Take 1,000 Units by mouth daily.  cholecalciferol (VITAMIN D3) 1,000 unit tablet Take  by mouth daily.  multivitamin (ONE A DAY) tablet Take 1 Tab by mouth daily. Allergies Allergen Reactions  Pcn [Penicillins] Hives, Shortness of Breath and Itching  Bactrim [Sulfamethoprim Ds] Unable to [employer] Family History Problem Relation Age of Onset  Hypertension Mother  Arthritis-rheumatoid Mother  Cancer Father   
     colon  Diabetes Father  Cancer Sister   
     breast cancer  Diabetes Sister  Thyroid Disease Sister   
     thyroid cancer  Breast Cancer Sister 64  Hypertension Brother  Cancer Brother   
     colon  Cancer Sister   
     thyroid  Seizures Sister  Cancer Paternal Grandfather   
     prostate Social History Tobacco Use  Smoking status: Current Every Day Smoker Years: 30.00 Last attempt to quit: 2013 Years since quittin.4  Smokeless tobacco: Never Used  Tobacco comment: 5 cigs per day Substance Use Topics  Alcohol use: No  
 
Patient Active Problem List  
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  
 Anxiety disorder F41.9  Major depressive disorder F32.9  
 H/O papillary adenocarcinoma of thyroid Z85.850  Post-surgical hypothyroidism E89.0  Major depressive disorder, recurrent episode, moderate (HCC) F33.1  Sleep apnea G47.30  MDD (major depressive disorder) F32.9  Diffusion capacity of lung (dl), decreased R94.2  Right leg pain M79.604  Degeneration of lumbar or lumbosacral intervertebral disc M51.37  Chronic back pain M54.9, G89.29  Lumbar facet arthropathy M47.816  Lumbar degenerative disc disease M51.36  
 Obesity, Class I, BMI 30-34.9 E66.9  
 Essential hypertension I10  
 Acquired hypothyroidism E03.9 Depression Risk Factor Screening: PHQ over the last two weeks 2018 Little interest or pleasure in doing things Several days Feeling down, depressed, irritable, or hopeless More than half the days Total Score PHQ 2 3 Trouble falling or staying asleep, or sleeping too much More than half the days Feeling tired or having little energy Nearly every day Poor appetite, weight loss, or overeating Not at all Feeling bad about yourself - or that you are a failure or have let yourself or your family down Several days Trouble concentrating on things such as school, work, reading, or watching TV Not at all Moving or speaking so slowly that other people could have noticed; or the opposite being so fidgety that others notice Not at all Thoughts of being better off dead, or hurting yourself in some way Not at all PHQ 9 Score 9 How difficult have these problems made it for you to do your work, take care of your home and get along with others Very difficult Alcohol Risk Factor Screening: You do not drink alcohol or very rarely. Functional Ability and Level of Safety:  
Hearing Loss Hearing is good. Activities of Daily Living The home contains: no safety equipment. Patient does total self care Fall Risk Fall Risk Assessment, last 12 mths 11/12/2018 Able to walk? Yes Fall in past 12 months? No  
 
 
Abuse Screen Patient is not abused Cognitive Screening Evaluation of Cognitive Function: 
Has your family/caregiver stated any concerns about your memory: no 
 
 
Patient Care Team  
Patient Care Team: 
Tessa Marion MD as PCP - General 
Lexie Marcelino MD (Otolaryngology) Ankit Ramirez MD as Consulting Provider (Endocrinology) Assessment/Plan Education and counseling provided: 
Are appropriate based on today's review and evaluation Diagnoses and all orders for this visit: 
 
1. Overweight 2. Hyperlipidemia, unspecified hyperlipidemia type 
-     atorvastatin (LIPITOR) 80 mg tablet; Take 1 Tab by mouth daily. -     ezetimibe (ZETIA) 10 mg tablet; Take 1 Tab by mouth daily. 3. Acquired hypothyroidism 
-     levothyroxine (SYNTHROID) 100 mcg tablet;  Take 1 Tab by mouth Daily (before breakfast). 4. Chronic bilateral low back pain without sciatica 
-     DULoxetine (CYMBALTA) 30 mg capsule; Take 1 Cap by mouth daily. 5. Anxiety 
-     DULoxetine (CYMBALTA) 30 mg capsule; Take 1 Cap by mouth daily. 6. Depression, unspecified depression type -     DULoxetine (CYMBALTA) 30 mg capsule; Take 1 Cap by mouth daily. 7. Major depressive disorder, recurrent episode, moderate ([address] Assessment & Plan: 
Stable, based on history, physical exam and review of pertinent labs, studies and medications; meds reconciled; continue current treatment plan. Key Psychotherapeutic Meds DULoxetine (CYMBALTA) 30 mg capsule Take 1 Cap by mouth daily. Other [address] [place] [address] The patient is on no other behavioral health meds. Lab Results Component Value Date/Time Sodium 144 11/06/2017 09:24 AM  
 Creatinine 0.97 11/06/2017 09:24 AM  
 TSH 0.087 11/06/2017 09:24 AM  
 WBC CANCELED 11/06/2017 09:25 AM  
 ALT (SGPT) 22 11/06/2017 09:24 AM  
 AST (SGOT) 19 11/06/2017 09:24 AM  
 
 
 
 
 
Health Maintenance Due Topic Date Due  Pneumococcal 19-64 Medium Risk (1 of 1 - PPSV23) 10/08/1979  
 DTaP/Tdap/Td series (1 - Tdap) 10/08/1981  PAP AKA CERVICAL CYTOLOGY  10/08/1981  Shingrix Vaccine Age 50> (1 of 2) 10/08/2010  MEDICARE YEARLY EXAM  03/14/2018  FOBT Q 1 YEAR, 18+  11/06/2018

## 2018-11-12 NOTE — ASSESSMENT & PLAN NOTE
Stable, based on history, physical exam and review of pertinent labs, studies and medications; meds reconciled; continue current treatment plan. Key Psychotherapeutic Meds DULoxetine (CYMBALTA) 30 mg capsule Take 1 Cap by mouth daily. Other [address] [place] [address] The patient is on no other behavioral health meds. Lab Results Component Value Date/Time  Sodium 144 11/06/2017 09:24 AM  
 Creatinine 0.97 11/06/2017 09:24 AM  
 TSH 0.087 11/06/2017 09:24 AM  
 WBC CANCELED 11/06/2017 09:25 AM  
 ALT (SGPT) 22 11/06/2017 09:24 AM  
 AST (SGOT) 19 11/06/2017 09:24 AM

## 2018-11-12 NOTE — PROGRESS NOTES
HISTORY OF PRESENT ILLNESS Medardo Luciano is a 62 y.o. female who presents today for annual wellness visit, hypertension follow up, cholesterol problem follow up, and cold sxs. Blood pressure 109/78, pulse 66, temperature 97.6 °F (36.4 °C), temperature source Oral, resp. rate 18, height 5' 7\" (1.702 m), weight 189 lb 9.6 oz (86 kg), SpO2 98 %. Body mass index is 29.7 kg/m². Chief Complaint Patient presents with  Annual Wellness Visit  
  fasting today  Hypertension f/u  Cholesterol Problem f/u  Cold Symptoms  
  congestion, ears hurt, cough with clear mucus x 1 week HPI Hyperlipidemia: Lipid panel on 11/06/2017 notable for total cholesterol 266, , HDL 60, and triglycerides 149. Pt continues with lipitor 80 mg daily and zetia 10 mg daily. Depression: PHQ 9 score was 9 which indicates mild sxs. Pt continues with cymbalta 30 mg daily. Cold sxs: Pt reports having had a cold for abut a week with no fever with lots of congestion. She notes that she coughs up phlegm and feels like the congestion in her nose drains to her throat when she wakes up in the morning. Pt advised to take mucinex dm with plenty of water. Advised take take vitamin C. If sxs get worse pt is asked to return to office. Health Maintenance: Referral given for Dr. Renetta Choudhary due to family history of colon cancer. Pt reports not having a GYN that she sees regularly. She hasn't gotten her flu shot because of cold sxs. I have asked pt a series of questions related to [employer]. Current Outpatient Medications Medication Sig Dispense Refill  atorvastatin (LIPITOR) 80 mg tablet Take 1 Tab by mouth daily. 30 Tab 5  levothyroxine (SYNTHROID) 100 mcg tablet Take 1 Tab by mouth Daily (before breakfast). 90 Tab 1  
 ezetimibe (ZETIA) 10 mg tablet Take 1 Tab by mouth daily. 30 Tab 5  DULoxetine (CYMBALTA) 30 mg capsule Take 1 Cap by mouth daily.  30 Cap 5  
  lisinopril (PRINIVIL, ZESTRIL) 20 mg tablet Take 1 Tab by mouth daily. 30 Tab 0  
 diazePAM (VALIUM) 5 mg tablet Take 5 mg 1 hour prior to MRI procedure Patient has MRI 2017 1 Tab 0  
 bipap machine kit by Does Not Apply route nightly.  vitamin e (E GEMS) 1,000 unit capsule Take 1,000 Units by mouth daily.  cholecalciferol (VITAMIN D3) 1,000 unit tablet Take  by mouth daily.  multivitamin (ONE A DAY) tablet Take 1 Tab by mouth daily. Allergies Allergen Reactions  Pcn [Penicillins] Hives, Shortness of Breath and Itching  Bactrim [Sulfamethoprim Ds] Unable to [employer] Past Medical History:  
Diagnosis Date  AC (acromioclavicular) joint bone spurs  Acid reflux  Anemia NEC  Arthritis   
 right arm  Cancer ([address] thyroid  Chronic pain  Depression  High cholesterol  Incontinence of urine  MDD (major depressive disorder)   Multiple thyroid nodules  Other ill-defined conditions(799.89) right shoulder pain  Sleep apnea   Thyroid disease Past Surgical History:  
Procedure Laterality Date  HX BREAST BIOPSY Right  US; neg [address]  HX HERNIA REPAIR    
 HX HYSTERECTOMY Family History Problem Relation Age of Onset  Hypertension Mother  Arthritis-rheumatoid Mother  Cancer Father   
     colon  Diabetes Father  Cancer Sister   
     breast cancer  Diabetes Sister  Thyroid Disease Sister   
     thyroid cancer  Breast Cancer Sister 64  Hypertension Brother  Cancer Brother   
     colon  Cancer Sister   
     thyroid  Seizures Sister  Cancer Paternal Grandfather   
     prostate Social History Tobacco Use  Smoking status: Current Every Day Smoker Years: 30.00 Last attempt to quit: 2013 Years since quittin.4  Smokeless tobacco: Never Used  Tobacco comment: 5 cigs per day Substance Use Topics  Alcohol use: No  
  
 
Review of Systems Constitutional: Negative. Negative for malaise/fatigue. Eyes: Negative for blurred vision. Respiratory: Negative for shortness of breath. Cardiovascular: Negative for chest pain and leg swelling. Musculoskeletal: Negative. Neurological: Negative. Negative for dizziness and headaches. All other systems reviewed and are negative. Physical Exam  
Constitutional: She is oriented to person, place, and time. She appears well-developed and well-nourished. No distress. HENT:  
Head: Normocephalic and atraumatic. Neck: Carotid bruit is not present. Cardiovascular: Normal rate, regular rhythm, normal heart sounds and intact distal pulses. Exam reveals no gallop and no friction rub. No murmur heard. Pulmonary/Chest: Effort normal and breath sounds normal. No respiratory distress. She has no wheezes. She has no rales. Musculoskeletal: She exhibits no edema. Neurological: She is alert and oriented to person, place, and time. Skin: She is not diaphoretic. Psychiatric: She has a normal mood and affect. Her behavior is normal. Judgment and thought content normal.  
Nursing note and vitals reviewed. ASSESSMENT and PLAN Diagnoses and all orders for this visit: 
 
1. Hyperlipidemia, unspecified hyperlipidemia type 
-     atorvastatin (LIPITOR) 80 mg tablet; Take 1 Tab by mouth daily. -     ezetimibe (ZETIA) 10 mg tablet; Take 1 Tab by mouth daily. 
-     METABOLIC PANEL, COMPREHENSIVE 
-     LIPID CASCADE 
-     CBC WITH AUTOMATED DIFF 
-     Prescriptions refilled and sent to pharmacy. Presumed stable, will assess levels today. Pt continues with lipitor 80 mg daily and zetia 10 mg daily 2. Acquired hypothyroidism 
-     levothyroxine (SYNTHROID) 100 mcg tablet; Take 1 Tab by mouth Daily (before breakfast).  
-     TSH 3RD GENERATION 
-     T4, FREE 
 -      Pt has not had lipid panel since 11/06/2017. Will reassess levels today. Pt continues with 100 mcg synthroid daily before breakfast. 
 
3. Chronic bilateral low back pain without sciatica 
-     DULoxetine (CYMBALTA) 30 mg capsule; Take 1 Cap by mouth daily. 
-     Pt continues with cymbalta 30 mg daily for pain. Prescription refilled and sent to pharmacy. 4. Anxiety 
-     DULoxetine (CYMBALTA) 30 mg capsule; Take 1 Cap by mouth daily. 
-      Pt continues with cymbalta , advised counseling, pt stated she will think about it. 5. Depression, unspecified depression type -     DULoxetine (CYMBALTA) 30 mg capsule; Take 1 Cap by mouth daily. 
-     See number 4 
 
6. Overweight -     I have reviewed/discussed the above normal BMI with the patient. I have recommended the following interventions: dietary management education, guidance, and counseling, encourage exercise, monitor weight and prescribed dietary intake . 7. Screening for depression 
-     DEPRESSION SCREEN ANNUAL 
-     Pt's PHQ9 score was a 9 which indicates mild sxs. 8. Colon cancer screening -     OCCULT BLOOD [facility]- Dr. Palma Riddle -     Because of family history, recommended pt get colonoscopy 9. Encounter for Medicare annual wellness exam 
      -      I have asked pt a series of questions related to [facility]. 10. Viral URI 
      -      Pt advised to take mucinex dm with plenty of water. Advised take take vitamin C. If sxs get worse pt is asked to return to office. Follow-up Disposition: 
Return in about 6 months (around 5/12/2019) for hyperlipidemia follow up, hypertension follow up. Medication risks/benefits/costs/interactions/alternatives discussed with patient.  
Advised patient to call back or return to office if symptoms worsen/change/persist.  If patient cannot reach us or should anything more severe/urgent arise he/she should proceed directly to the nearest emergency department. Discussed expected course/resolution/complications of diagnosis in detail with patient. Patient given a written after visit summary which includes their diagnoses, current medications and vitals. Patient expressed understanding with the diagnosis and plan. Written by paulina Vidales, as dictated by Carlos Enrique Clark M.D. 
12:09 PM - 12:23 PM 
Total time spent with the patient 14 minutes, greater than 50% of time spent counseling patient.

## 2018-11-12 NOTE — PATIENT INSTRUCTIONS
Medicare Wellness Visit, Female The best way to live healthy is to have a lifestyle where you eat a well-balanced diet, exercise regularly, limit alcohol use, and quit all forms of tobacco/nicotine, if applicable. Regular preventive services are another way to keep healthy. Preventive services (vaccines, screening tests, monitoring & exams) can help personalize your care plan, which helps you manage your own care. Screening tests can find health problems at the earliest stages, when they are easiest to treat. [facility] follows the current, evidence-based guidelines published by the [facility] Shama ([facility]) when recommending preventive services for our patients. Because we follow these guidelines, sometimes recommendations change over time as research supports it. (For example, mammograms used to be recommended annually. Even though Medicare will still pay for an annual mammogram, the newer guidelines recommend a mammogram every two years for women of average risk.) Of course, you and your doctor may decide to screen more often for some diseases, based on your risk and your health status. Preventive services for you include: - Medicare offers their members a free annual wellness visit, which is time for you and your primary care provider to discuss and plan for your preventive service needs. Take advantage of this benefit every year! 
-All adults over the age of 72 should receive the recommended pneumonia vaccines. Current USPSTF guidelines recommend a series of two vaccines for the best pneumonia protection.  
-All adults should have a flu vaccine yearly and a tetanus vaccine every 10 years. All adults age 61 and older should receive a shingles vaccine once in their lifetime.   
-A bone mass density test is recommended when a woman turns 65 to screen for osteoporosis. This test is only recommended one time, as a screening. Some providers will use this same test as a disease monitoring tool if you already have osteoporosis. -All adults age 38-68 who are overweight should have a diabetes screening test once every three years.  
-Other screening tests and preventive services for persons with diabetes include: an eye exam to screen for diabetic retinopathy, a kidney function test, a foot exam, and stricter control over your cholesterol.  
-Cardiovascular screening for adults with routine risk involves an electrocardiogram (ECG) at intervals determined by your doctor.  
-Colorectal cancer screenings should be done for adults age 54-65 with no increased risk factors for colorectal cancer. There are a number of acceptable methods of screening for this type of cancer. Each test has its own benefits and drawbacks. Discuss with your doctor what is most appropriate for you during your annual wellness visit. The different tests include: colonoscopy (considered the best screening method), a fecal occult blood test, a fecal DNA test, and sigmoidoscopy. -Breast cancer screenings are recommended every other year for women of normal risk, age 54-69. 
-Cervical cancer screenings for women over age 72 are only recommended with certain risk factors.  
-All adults born between [facility] should be screened once for Hepatitis C. Here is a list of your current Health Maintenance items (your personalized list of preventive services) with a due date: 
Health Maintenance Due Topic Date Due  Pneumococcal Vaccine (1 of 1 - PPSV23) 10/08/1979  
 DTaP/Tdap/Td  (1 - Tdap) 10/08/1981  Cervical Cancer Screening  10/08/1981  Shingles Vaccine (1 of 2) 10/08/2010 [address] [place] [address] Annual Well Visit  03/14/2018  Stool testing for trace blood  11/06/2018

## 2018-11-13 LAB
ALBUMIN SERPL-MCNC: 4.4 G/DL (ref 3.5–5.5)
ALBUMIN/GLOB SERPL: 1.5 {RATIO} (ref 1.2–2.2)
ALP SERPL-CCNC: 67 IU/L (ref 39–117)
ALT SERPL-CCNC: 17 IU/L (ref 0–32)
AST SERPL-CCNC: 21 IU/L (ref 0–40)
BASOPHILS # BLD AUTO: 0 X10E3/UL (ref 0–0.2)
BASOPHILS NFR BLD AUTO: 1 %
BILIRUB SERPL-MCNC: 0.3 MG/DL (ref 0–1.2)
BUN SERPL-MCNC: 12 MG/DL (ref 6–24)
BUN/CREAT SERPL: 12 (ref 9–23)
CALCIUM SERPL-MCNC: 9.2 MG/DL (ref 8.7–10.2)
CHLORIDE SERPL-SCNC: 102 MMOL/L (ref 96–106)
CHOLEST SERPL-MCNC: 329 MG/DL (ref 100–199)
CO2 SERPL-SCNC: 25 MMOL/L (ref 20–29)
COMMENT, 011824: ABNORMAL
CREAT SERPL-MCNC: 1.04 MG/DL (ref 0.57–1)
EOSINOPHIL # BLD AUTO: 0.2 X10E3/UL (ref 0–0.4)
EOSINOPHIL NFR BLD AUTO: 2 %
ERYTHROCYTE [DISTWIDTH] IN BLOOD BY AUTOMATED COUNT: 14.7 % (ref 12.3–15.4)
GLOBULIN SER CALC-MCNC: 3 G/DL (ref 1.5–4.5)
GLUCOSE SERPL-MCNC: 97 MG/DL (ref 65–99)
HCT VFR BLD AUTO: 44.4 % (ref 34–46.6)
HDLC SERPL-MCNC: 62 MG/DL
HGB BLD-MCNC: 14.6 G/DL (ref 11.1–15.9)
IMM GRANULOCYTES # BLD: 0 X10E3/UL (ref 0–0.1)
IMM GRANULOCYTES NFR BLD: 0 %
INTERPRETATION, 910389: NORMAL
INTERPRETATION: NORMAL
LDLC SERPL CALC-MCNC: 233 MG/DL (ref 0–99)
LDLC/HDLC SERPL: 3.8 RATIO (ref 0–3.2)
LYMPHOCYTES # BLD AUTO: 4.2 X10E3/UL (ref 0.7–3.1)
LYMPHOCYTES NFR BLD AUTO: 53 %
MCH RBC QN AUTO: 31.1 PG (ref 26.6–33)
MCHC RBC AUTO-ENTMCNC: 32.9 G/DL (ref 31.5–35.7)
MCV RBC AUTO: 95 FL (ref 79–97)
MONOCYTES # BLD AUTO: 0.6 X10E3/UL (ref 0.1–0.9)
MONOCYTES NFR BLD AUTO: 7 %
NEUTROPHILS # BLD AUTO: 2.9 X10E3/UL (ref 1.4–7)
NEUTROPHILS NFR BLD AUTO: 37 %
NONHDLC SERPL-MCNC: 267 MG/DL (ref 0–129)
PDF IMAGE, 910387: NORMAL
PLATELET # BLD AUTO: 329 X10E3/UL (ref 150–379)
POTASSIUM SERPL-SCNC: 3.6 MMOL/L (ref 3.5–5.2)
PROT SERPL-MCNC: 7.4 G/DL (ref 6–8.5)
RBC # BLD AUTO: 4.7 X10E6/UL (ref 3.77–5.28)
SODIUM SERPL-SCNC: 142 MMOL/L (ref 134–144)
T4 FREE SERPL-MCNC: 0.58 NG/DL (ref 0.82–1.77)
TRIGL SERPL-MCNC: 168 MG/DL (ref 0–149)
TSH SERPL DL<=0.005 MIU/L-ACNC: 29.05 UIU/ML (ref 0.45–4.5)
WBC # BLD AUTO: 7.8 X10E3/UL (ref 3.4–10.8)

## 2018-12-09 NOTE — PROGRESS NOTES
1) thyroid is not at goal 
Increase synthroid to 125 mcg daily #30 2 refills Please pend medication to me and inform pt of inrcease dosage.  
 
2)also her cholesterol is poorly controlled- please confirm if she is taking her meds

## 2018-12-13 DIAGNOSIS — E03.9 ACQUIRED HYPOTHYROIDISM: Primary | ICD-10-CM

## 2018-12-13 RX ORDER — LEVOTHYROXINE SODIUM 125 UG/1
125 TABLET ORAL
Qty: 30 TAB | Refills: 2 | Status: SHIPPED | OUTPATIENT
Start: 2018-12-13 | End: 2019-09-12 | Stop reason: SDUPTHER

## 2018-12-13 NOTE — TELEPHONE ENCOUNTER
Per your lab note to  increase Thyroid dose to 125 mcg per day and pend rx to you. I have a call out to patient.

## 2019-01-25 ENCOUNTER — TELEPHONE (OUTPATIENT)
Dept: FAMILY MEDICINE CLINIC | Age: 59
End: 2019-01-25

## 2019-01-25 NOTE — TELEPHONE ENCOUNTER
----- Message from Merary Milner MD sent at 12/9/2018  2:51 PM EST -----  1) thyroid is not at goal  Increase synthroid to 125 mcg daily #30 2 refills  Please pend medication to me and inform pt of inrcease dosage.     2)also her cholesterol is poorly controlled- please confirm if she is taking her meds

## 2019-01-25 NOTE — PROGRESS NOTES
Spoke to patient she was in the office with her Mothers appointment [address] [place] [address] notified patient of her labs and understand Per patient she is taking her medication for her cholesterol

## 2019-01-25 NOTE — TELEPHONE ENCOUNTER
Spoke to patient she was in the office with her Mothers appointment [address] [place] [address] notified patient of her labs and understand  Per patient she is taking her medication for her cholesterol  Pend thyroid medication for Dr Pattie Stephens

## 2019-02-15 RX ORDER — LEVOTHYROXINE SODIUM 125 UG/1
125 TABLET ORAL
Qty: 30 TAB | Refills: 2 | Status: SHIPPED | OUTPATIENT
Start: 2019-02-15 | End: 2019-09-12 | Stop reason: SDUPTHER

## 2019-04-10 DIAGNOSIS — E78.5 HYPERLIPIDEMIA, UNSPECIFIED HYPERLIPIDEMIA TYPE: ICD-10-CM

## 2019-04-10 RX ORDER — ATORVASTATIN CALCIUM 80 MG/1
80 TABLET, FILM COATED ORAL DAILY
Qty: 30 TAB | Refills: 5 | Status: SHIPPED | OUTPATIENT
Start: 2019-04-10 | End: 2019-10-02 | Stop reason: SDUPTHER

## 2019-04-10 NOTE — TELEPHONE ENCOUNTER
Pharmacy requesting 90 day supply     Requested Prescriptions     Pending Prescriptions Disp Refills    atorvastatin (LIPITOR) 80 mg tablet 30 Tab 5     Sig: Take 1 Tab by mouth daily. Pharmacy Comments: Pt wants a 90 day supply for lower copay.  Please update quantity    Pharmacy on file verified  [facility])    LOV  Monday, November 12, 2018 10:30 AM  Pt to be seen in office on Monday, May 13, 2019 09:30 AM

## 2019-05-08 DIAGNOSIS — E78.5 HYPERLIPIDEMIA, UNSPECIFIED HYPERLIPIDEMIA TYPE: ICD-10-CM

## 2019-05-08 NOTE — TELEPHONE ENCOUNTER
Pharmacy requesting 90 day supply on medication refill     Requested Prescriptions     Pending Prescriptions Disp Refills    ezetimibe (ZETIA) 10 mg tablet 30 Tab 5     Sig: Take 1 Tab by mouth daily. Pharmacy Comments: Pt request 90 day please.  Correct RX and send in new one, helps adherence    Pharmacy on file verified  ([id number])    LOV  Monday, November 12, 2018 10:30 AM   Pt to be seen in office on  Monday, May 13, 2019 09:30 AM

## 2019-05-09 RX ORDER — EZETIMIBE 10 MG/1
10 TABLET ORAL DAILY
Qty: 90 TAB | Refills: 1 | Status: SHIPPED | OUTPATIENT
Start: 2019-05-09 | End: 2019-10-02 | Stop reason: SDUPTHER

## 2019-07-05 DIAGNOSIS — I10 ESSENTIAL HYPERTENSION: ICD-10-CM

## 2019-07-05 NOTE — TELEPHONE ENCOUNTER
Pharmacy requesting medication refill     Requested Prescriptions     Pending Prescriptions Disp Refills    lisinopril (PRINIVIL, ZESTRIL) 20 mg tablet 90 Tab 1     Pharmacy on file verified  ([id number])    [address] [place] [address] 11/12/2018  Instructions         Return in about 6 months (around 5/12/2019) for hyperlipidemia follow up, hypertension follow up.

## 2019-07-08 RX ORDER — LISINOPRIL 20 MG/1
TABLET ORAL
Qty: 90 TAB | Refills: 0 | Status: SHIPPED | OUTPATIENT
Start: 2019-07-08 | End: 2019-10-02 | Stop reason: SDUPTHER

## 2019-10-02 ENCOUNTER — OFFICE VISIT (OUTPATIENT)
Dept: FAMILY MEDICINE CLINIC | Age: 59
End: 2019-10-02

## 2019-10-02 VITALS
SYSTOLIC BLOOD PRESSURE: 137 MMHG | OXYGEN SATURATION: 100 % | WEIGHT: 182 LBS | TEMPERATURE: 98.2 F | RESPIRATION RATE: 18 BRPM | HEART RATE: 68 BPM | DIASTOLIC BLOOD PRESSURE: 89 MMHG | BODY MASS INDEX: 28.56 KG/M2 | HEIGHT: 67 IN

## 2019-10-02 DIAGNOSIS — F41.9 ANXIETY: Primary | ICD-10-CM

## 2019-10-02 DIAGNOSIS — I10 ESSENTIAL HYPERTENSION: ICD-10-CM

## 2019-10-02 DIAGNOSIS — R53.81 MALAISE AND FATIGUE: ICD-10-CM

## 2019-10-02 DIAGNOSIS — R53.83 MALAISE AND FATIGUE: ICD-10-CM

## 2019-10-02 DIAGNOSIS — F32.A DEPRESSION, UNSPECIFIED DEPRESSION TYPE: ICD-10-CM

## 2019-10-02 DIAGNOSIS — E66.3 OVERWEIGHT: ICD-10-CM

## 2019-10-02 DIAGNOSIS — G89.29 CHRONIC BILATERAL LOW BACK PAIN WITHOUT SCIATICA: ICD-10-CM

## 2019-10-02 DIAGNOSIS — E55.9 VITAMIN D DEFICIENCY: ICD-10-CM

## 2019-10-02 DIAGNOSIS — Z12.11 COLON CANCER SCREENING: ICD-10-CM

## 2019-10-02 DIAGNOSIS — F17.200 SMOKING: ICD-10-CM

## 2019-10-02 DIAGNOSIS — K05.10 GINGIVITIS: ICD-10-CM

## 2019-10-02 DIAGNOSIS — E78.5 HYPERLIPIDEMIA, UNSPECIFIED HYPERLIPIDEMIA TYPE: ICD-10-CM

## 2019-10-02 DIAGNOSIS — Z23 ENCOUNTER FOR IMMUNIZATION: ICD-10-CM

## 2019-10-02 DIAGNOSIS — M54.50 CHRONIC BILATERAL LOW BACK PAIN WITHOUT SCIATICA: ICD-10-CM

## 2019-10-02 RX ORDER — EZETIMIBE 10 MG/1
10 TABLET ORAL DAILY
Qty: 90 TAB | Refills: 1 | Status: SHIPPED | OUTPATIENT
Start: 2019-10-02 | End: 2020-04-20 | Stop reason: SDUPTHER

## 2019-10-02 RX ORDER — VARENICLINE TARTRATE 25 MG
KIT ORAL
Qty: 1 DOSE PACK | Refills: 0 | Status: SHIPPED | OUTPATIENT
Start: 2019-10-02 | End: 2019-10-28 | Stop reason: DRUGHIGH

## 2019-10-02 RX ORDER — CLINDAMYCIN HYDROCHLORIDE 300 MG/1
300 CAPSULE ORAL 3 TIMES DAILY
Qty: 30 CAP | Refills: 0 | Status: SHIPPED | OUTPATIENT
Start: 2019-10-02 | End: 2019-10-12

## 2019-10-02 RX ORDER — ATORVASTATIN CALCIUM 80 MG/1
80 TABLET, FILM COATED ORAL DAILY
Qty: 30 TAB | Refills: 5 | Status: SHIPPED | OUTPATIENT
Start: 2019-10-02 | End: 2020-04-20 | Stop reason: SDUPTHER

## 2019-10-02 RX ORDER — DULOXETIN HYDROCHLORIDE 30 MG/1
30 CAPSULE, DELAYED RELEASE ORAL DAILY
Qty: 30 CAP | Refills: 5 | Status: SHIPPED | OUTPATIENT
Start: 2019-10-02 | End: 2020-04-20 | Stop reason: SDUPTHER

## 2019-10-02 RX ORDER — LISINOPRIL 20 MG/1
TABLET ORAL
Qty: 90 TAB | Refills: 0 | Status: SHIPPED | OUTPATIENT
Start: 2019-10-02 | End: 2020-04-20 | Stop reason: SDUPTHER

## 2019-10-02 NOTE — PROGRESS NOTES
HPI  Myrna Hauser 62 y.o. female  presents to the office today for follow up on disease management. Blood pressure 137/89, pulse 68, temperature 98.2 °F (36.8 °C), temperature source Oral, resp. rate 18, height 5' 7\" (1.702 m), weight 182 lb (82.6 kg), SpO2 100 %. Body mass index is 28.51 kg/m². Chief Complaint   Patient presents with    Cholesterol Problem     follow up     Hypertension     follow up     Sleep Problem     difficulty sleeping for several years. wakes frequently thoroughout night     Nicotine Dependence     dicuss other medications to help quit smoking. pathces have not worked         Depression/Anxiety/Malaise and fatigue: Pt states that she has been going through severe stress since her brother passed away. Pt is exhausted since she has to take care of her mother; no family members have been helping out, pt is taking care of home tasks by herself. Pt admits that she has difficulty sleeping for several years, and she wakes up very frequently through the night. Pt is currently taking Cymbalta 30 mg/d    Hypertension: BP at office today 137/89. Pt continues with Lisinopril 20 mg/d. Hyperlipidemia: Lipid panel on 11/12/18 notable for total cholesterol 329, HDL 62, , and triglycerides 168. Pt continues with Atorvastatin 80 mg/d. Smoking: Pt has tried nicotine patches, but they have not been working. Pt inquires about alternative medication treatment for tobacco cessation. The patient was counseled on the dangers of tobacco use, and was advised to quit. Reviewed strategies to maximize success, including stress management, support of family/friends and written materials. Overweight: I have reviewed/discussed the above normal BMI with the patient. Gingivitis: Pt was informed by her dentist that she has infection in the gum, and that causes pain. Vitamin D deficiency: Pt's vitamin D levels were 11.7 on 12/21/16. Pt continues with Cholecalciferol.      Chronic bilateral low back pain without sciatica: Pt is taking Cymbalta 30 mg/d for back pain. Health Maintenance: Pt has not had PAP this year. Will provide pt with FIT test kit, and pt will mail sample to office. Since pt is a smoker, I strongly advised pt to get pneumonia vaccine today to maintain quality of life. Pt's father is diabetic, and pt inquires about BG testing. Current Outpatient Medications   Medication Sig Dispense Refill    clindamycin (CLEOCIN) 300 mg capsule Take 1 Cap by mouth three (3) times daily for 10 days. 30 Cap 0    lisinopril (PRINIVIL, ZESTRIL) 20 mg tablet TAKE 1 TABLET BY MOUTH ONCE DAILY 90 Tab 0    ezetimibe (ZETIA) 10 mg tablet Take 1 Tab by mouth daily. 90 Tab 1    atorvastatin (LIPITOR) 80 mg tablet Take 1 Tab by mouth daily. 30 Tab 5    DULoxetine (CYMBALTA) 30 mg capsule Take 1 Cap by mouth daily. 30 Cap 5    varenicline (CHANTIX STARTER ALFREDITO) 0.5 mg (11)- 1 mg (42) DsPk Take a directed 1 Dose Pack 0    levothyroxine (SYNTHROID) 125 mcg tablet Take 1 Tab by mouth Daily (before breakfast). 30 Tab 2    cholecalciferol (VITAMIN D3) 1,000 unit tablet Take  by mouth daily.  multivitamin (ONE A DAY) tablet Take 1 Tab by mouth daily.          Allergies   Allergen Reactions    Pcn [Penicillins] Hives, Shortness of Breath and Itching    Bactrim [Sulfamethoprim Ds] Unable to Obtain     Past Medical History:   Diagnosis Date    AC (acromioclavicular) joint bone spurs     Acid reflux     Anemia NEC     Arthritis     right arm     Cancer (HCC)     thyroid    Chronic pain     Depression     High cholesterol     Incontinence of urine     MDD (major depressive disorder) 2013    Multiple thyroid nodules     Other ill-defined conditions(799.89)     right shoulder pain    Sleep apnea 2013    Thyroid disease      Past Surgical History:   Procedure Laterality Date    HX BREAST BIOPSY Right 2009    US; neg    HX  SECTION  1987    HX HERNIA REPAIR      HX HYSTERECTOMY       Family History   Problem Relation Age of Onset    Hypertension Mother    [address] [facility] Arthritis-rheumatoid Mother     Cancer Father         colon    Diabetes Father     Cancer Sister         breast cancer    Diabetes Sister     Thyroid Disease Sister         thyroid cancer    Breast Cancer Sister 64    Hypertension Brother     Cancer Brother         colon    Cancer Sister         thyroid    Seizures Sister     Cancer Paternal Grandfather         prostate      Social History     Tobacco Use    Smoking status: Current Every Day Smoker     Years: 30.00     Last attempt to quit: 2013     Years since quittin.3    Smokeless tobacco: Never Used    Tobacco comment: 5 cigs per day   Substance Use Topics    Alcohol use: No        Review of Systems   Constitutional: Positive for malaise/fatigue. Negative for chills and fever. HENT: Negative for hearing loss and tinnitus. Eyes: Negative for blurred vision and double vision. Respiratory: Negative for cough and shortness of breath. Cardiovascular: Negative for chest pain and palpitations. Gastrointestinal: Negative for nausea and vomiting. Genitourinary: Negative for dysuria and frequency. Musculoskeletal: Positive for back pain. Negative for falls. Skin: Negative for itching and rash. Neurological: Negative for dizziness, loss of consciousness and headaches. Psychiatric/Behavioral: Positive for depression. The patient is nervous/anxious and has insomnia. Physical Exam   Constitutional: She is oriented to person, place, and time. She appears well-developed and well-nourished. HENT:   Head: Normocephalic and atraumatic. Right Ear: External ear normal.   Left Ear: External ear normal.   Nose: Nose normal.   Mouth/Throat: Oropharynx is clear and moist.   Eyes: Conjunctivae and EOM are normal.   Neck: Normal range of motion. Neck supple.    Cardiovascular: Normal rate, regular rhythm, normal heart sounds and intact distal pulses. Pulmonary/Chest: Effort normal and breath sounds normal.   Abdominal: Soft. Bowel sounds are normal.   Musculoskeletal: Normal range of motion. Lumbar back: She exhibits pain. Neurological: She is alert and oriented to person, place, and time. Skin: Skin is warm and dry. Psychiatric: She has a normal mood and affect. Her behavior is normal. Judgment and thought content normal.   Nursing note and vitals reviewed. ASSESSMENT and PLAN  Diagnoses and all orders for this visit:    1. Anxiety  Pt continues with Cymbalta 30 mg/d.   -     DULoxetine (CYMBALTA) 30 mg capsule; Take 1 Cap by mouth daily. 2. Essential hypertension  BP is at goal today in office. Advised pt to continue with Lisinopril 20 mg/d. -     METABOLIC PANEL, COMPREHENSIVE  -     CBC WITH AUTOMATED DIFF  -     lisinopril (PRINIVIL, ZESTRIL) 20 mg tablet; TAKE 1 TABLET BY MOUTH ONCE DAILY    3. Hyperlipidemia, unspecified hyperlipidemia type  Presumed stable, will assess levels today. Pt continues with Atorvastatin 80 mg/d.   -     LIPID PANEL  -     ezetimibe (ZETIA) 10 mg tablet; Take 1 Tab by mouth daily. -     atorvastatin (LIPITOR) 80 mg tablet; Take 1 Tab by mouth daily. 4. Smoking  The patient was counseled on the dangers of tobacco use, and was advised to quit. Reviewed strategies to maximize success, including stress management, support of family/friends and written materials. Provided pt with rx for Varenicline and advised pt to take as instructed. -     varenicline (CHANTIX STARTER ALFREDITO) 0.5 mg (11)- 1 mg (42) DsPk; Take a directed    5. Overweight  I have reviewed/discussed the above normal BMI with the patient. I have recommended the following interventions: dietary management education, guidance, and counseling, encourage exercise and monitor weight . 6. Gingivitis  Provided pt with rx for Clindamycin 300 mg/TID for 10 days for gum infection.  Pt should continue following up with dentist. -     clindamycin (CLEOCIN) 300 mg capsule; Take 1 Cap by mouth three (3) times daily for 10 days. 7. Colon cancer screening  Provided pt with FIT test kit and pt will send sample to office.   -     OCCULT BLOOD IMMUNOASSAY,DIAGNOSTIC    8. Malaise and fatigue  Most likely due to anxiety/depression. Will assess TSH levels today.   -     TSH 3RD GENERATION  -     T4, FREE    9. Vitamin D deficiency  Presumed stable, will assess levels today. Pt continues with Cholecalciferol.   -     VITAMIN D, 25 HYDROXY    10. Chronic bilateral low back pain without sciatica  Pt does not have any complaints today in office. Pt continues with Cymbalta 30 mg/d for back pain. -     DULoxetine (CYMBALTA) 30 mg capsule; Take 1 Cap by mouth daily. 11. Depression, unspecified depression type  Pt continues with Cymbalta 30 mg/d.   -     DULoxetine (CYMBALTA) 30 mg capsule; Take 1 Cap by mouth daily. Follow-up and Dispositions    · Return in about 6 months (around 4/2/2020) for hyperlipidemia follow up, hypertension follow up. Medication risks/benefits/costs/interactions/alternatives discussed with patient. Advised patient to call back or return to office if symptoms worsen/change/persist.  If patient cannot reach us or should anything more severe/urgent arise he/she should proceed directly to the nearest emergency department. Discussed expected course/resolution/complications of diagnosis in detail with patient. Patient given a written after visit summary which includes her diagnoses, current medications and vitals. Patient expressed understanding with the diagnosis and plan. Written by paulina Lorenzo, as dictated by Elvira Mosher M.D.    11:04 AM - 11:15 AM    Total time spent with the patient 11 minutes, greater than 50% of time spent counseling patient.

## 2019-10-02 NOTE — PATIENT INSTRUCTIONS
Body Mass Index: Care Instructions  Your Care Instructions    Body mass index (BMI) can help you see if your weight is raising your risk for health problems. It uses a formula to compare how much you weigh with how tall you are. · A BMI lower than 18.5 is considered underweight. · A BMI between 18.5 and 24.9 is considered healthy. · A BMI between 25 and 29.9 is considered overweight. A BMI of 30 or higher is considered obese. If your BMI is in the normal range, it means that you have a lower risk for weight-related health problems. If your BMI is in the overweight or obese range, you may be at increased risk for weight-related health problems, such as high blood pressure, heart disease, stroke, arthritis or joint pain, and diabetes. If your BMI is in the underweight range, you may be at increased risk for health problems such as fatigue, lower protection (immunity) against illness, muscle loss, bone loss, hair loss, and hormone problems. BMI is just one measure of your risk for weight-related health problems. You may be at higher risk for health problems if you are not active, you eat an unhealthy diet, or you drink too much alcohol or use tobacco products. Follow-up care is a key part of your treatment and safety. Be sure to make and go to all appointments, and call your doctor if you are having problems. It's also a good idea to know your test results and keep a list of the medicines you take. How can you care for yourself at home? · Practice healthy eating habits. This includes eating plenty of fruits, vegetables, whole grains, lean protein, and low-fat dairy. · If your doctor recommends it, get more exercise. Walking is a good choice. Bit by bit, increase the amount you walk every day. Try for at least 30 minutes on most days of the week. · Do not smoke. Smoking can increase your risk for health problems. If you need help quitting, talk to your doctor about stop-smoking programs and medicines. These can increase your chances of quitting for good. · Limit alcohol to 2 drinks a day for men and 1 drink a day for women. Too much alcohol can cause health problems. If you have a BMI higher than 25  · Your doctor may do other tests to check your risk for weight-related health problems. This may include measuring the distance around your waist. A waist measurement of more than 40 inches in men or 35 inches in women can increase the risk of weight-related health problems. · Talk with your doctor about steps you can take to stay healthy or improve your health. You may need to make lifestyle changes to lose weight and stay healthy, such as changing your diet and getting regular exercise. If you have a BMI lower than 18.5  · Your doctor may do other tests to check your risk for health problems. · Talk with your doctor about steps you can take to stay healthy or improve your health. You may need to make lifestyle changes to gain or maintain weight and stay healthy, such as getting more healthy foods in your diet and doing exercises to build muscle. Where can you learn more? Go to http://marielena-kaley.info/. Enter S176 in the search box to learn more about \"Body Mass Index: Care Instructions. \"  Current as of: March 28, 2019  Content Version: 12.2  © 5780-2328 [employer]. Care instructions adapted under license by [employer] (which disclaims liability or warranty for this information). If you have questions about a medical condition or this instruction, always ask your healthcare professional. [address] any warranty or liability for your use of this information.

## 2019-10-02 NOTE — PROGRESS NOTES
Chief Complaint   Patient presents with    Cholesterol Problem     follow up     Hypertension     follow up     Sleep Problem     difficulty sleeping for several years. wakes frequently thoroughout night     Nicotine Dependence     dicuss other medications to help quit smoking. pathces have not worked      1. Have you been to the ER, urgent care clinic since your last visit? Hospitalized since your last visit? No    2. Have you seen or consulted any other health care providers outside of the [address] [place] [address] since your last visit? Include any pap smears or colon screening.  No

## 2019-10-03 LAB
25(OH)D3+25(OH)D2 SERPL-MCNC: 19.3 NG/ML (ref 30–100)
ALBUMIN SERPL-MCNC: 4.5 G/DL (ref 3.5–5.5)
ALBUMIN/GLOB SERPL: 1.6 {RATIO} (ref 1.2–2.2)
ALP SERPL-CCNC: 68 IU/L (ref 39–117)
ALT SERPL-CCNC: 28 IU/L (ref 0–32)
AST SERPL-CCNC: 26 IU/L (ref 0–40)
BASOPHILS # BLD AUTO: 0 X10E3/UL (ref 0–0.2)
BASOPHILS NFR BLD AUTO: 0 %
BILIRUB SERPL-MCNC: 0.5 MG/DL (ref 0–1.2)
BUN SERPL-MCNC: 12 MG/DL (ref 6–24)
BUN/CREAT SERPL: 12 (ref 9–23)
CALCIUM SERPL-MCNC: 9.4 MG/DL (ref 8.7–10.2)
CHLORIDE SERPL-SCNC: 106 MMOL/L (ref 96–106)
CHOLEST SERPL-MCNC: 196 MG/DL (ref 100–199)
CO2 SERPL-SCNC: 20 MMOL/L (ref 20–29)
CREAT SERPL-MCNC: 1.01 MG/DL (ref 0.57–1)
EOSINOPHIL # BLD AUTO: 0.1 X10E3/UL (ref 0–0.4)
EOSINOPHIL NFR BLD AUTO: 2 %
ERYTHROCYTE [DISTWIDTH] IN BLOOD BY AUTOMATED COUNT: 13.7 % (ref 12.3–15.4)
GLOBULIN SER CALC-MCNC: 2.8 G/DL (ref 1.5–4.5)
GLUCOSE SERPL-MCNC: 84 MG/DL (ref 65–99)
HCT VFR BLD AUTO: 43.7 % (ref 34–46.6)
HDLC SERPL-MCNC: 70 MG/DL
HGB BLD-MCNC: 14.8 G/DL (ref 11.1–15.9)
IMM GRANULOCYTES # BLD AUTO: 0 X10E3/UL (ref 0–0.1)
IMM GRANULOCYTES NFR BLD AUTO: 0 %
INTERPRETATION, 910389: NORMAL
LDLC SERPL CALC-MCNC: 105 MG/DL (ref 0–99)
LYMPHOCYTES # BLD AUTO: 3.4 X10E3/UL (ref 0.7–3.1)
LYMPHOCYTES NFR BLD AUTO: 45 %
MCH RBC QN AUTO: 31.7 PG (ref 26.6–33)
MCHC RBC AUTO-ENTMCNC: 33.9 G/DL (ref 31.5–35.7)
MCV RBC AUTO: 94 FL (ref 79–97)
MONOCYTES # BLD AUTO: 0.7 X10E3/UL (ref 0.1–0.9)
MONOCYTES NFR BLD AUTO: 9 %
NEUTROPHILS # BLD AUTO: 3.2 X10E3/UL (ref 1.4–7)
NEUTROPHILS NFR BLD AUTO: 44 %
PLATELET # BLD AUTO: 284 X10E3/UL (ref 150–450)
POTASSIUM SERPL-SCNC: 3.8 MMOL/L (ref 3.5–5.2)
PROT SERPL-MCNC: 7.3 G/DL (ref 6–8.5)
RBC # BLD AUTO: 4.67 X10E6/UL (ref 3.77–5.28)
SODIUM SERPL-SCNC: 145 MMOL/L (ref 134–144)
T4 FREE SERPL-MCNC: 1.27 NG/DL (ref 0.82–1.77)
TRIGL SERPL-MCNC: 106 MG/DL (ref 0–149)
TSH SERPL DL<=0.005 MIU/L-ACNC: 7.77 UIU/ML (ref 0.45–4.5)
VLDLC SERPL CALC-MCNC: 21 MG/DL (ref 5–40)
WBC # BLD AUTO: 7.4 X10E3/UL (ref 3.4–10.8)

## 2019-10-28 ENCOUNTER — OFFICE VISIT (OUTPATIENT)
Dept: FAMILY MEDICINE CLINIC | Age: 59
End: 2019-10-28

## 2019-10-28 VITALS
SYSTOLIC BLOOD PRESSURE: 137 MMHG | OXYGEN SATURATION: 86 % | WEIGHT: 186.2 LBS | BODY MASS INDEX: 29.22 KG/M2 | RESPIRATION RATE: 19 BRPM | HEART RATE: 65 BPM | TEMPERATURE: 97.8 F | DIASTOLIC BLOOD PRESSURE: 89 MMHG | HEIGHT: 67 IN

## 2019-10-28 DIAGNOSIS — F33.1 MAJOR DEPRESSIVE DISORDER, RECURRENT EPISODE, MODERATE (HCC): ICD-10-CM

## 2019-10-28 DIAGNOSIS — Z23 ENCOUNTER FOR IMMUNIZATION: ICD-10-CM

## 2019-10-28 DIAGNOSIS — Z12.31 SCREENING MAMMOGRAM, ENCOUNTER FOR: ICD-10-CM

## 2019-10-28 DIAGNOSIS — Z72.0 TOBACCO ABUSE: Primary | ICD-10-CM

## 2019-10-28 DIAGNOSIS — E03.9 ACQUIRED HYPOTHYROIDISM: ICD-10-CM

## 2019-10-28 DIAGNOSIS — I10 ESSENTIAL HYPERTENSION: ICD-10-CM

## 2019-10-28 RX ORDER — LEVOTHYROXINE SODIUM 137 UG/1
137 TABLET ORAL
Qty: 30 TAB | Refills: 2 | Status: SHIPPED | OUTPATIENT
Start: 2019-10-28 | End: 2020-03-12

## 2019-10-28 RX ORDER — VARENICLINE TARTRATE 1 MG/1
1 TABLET, FILM COATED ORAL 2 TIMES DAILY
Qty: 180 TAB | Refills: 0 | Status: SHIPPED | OUTPATIENT
Start: 2019-10-28 | End: 2020-04-20 | Stop reason: SDUPTHER

## 2019-10-28 NOTE — PATIENT INSTRUCTIONS
Vaccine Information Statement Influenza (Flu) Vaccine (Inactivated or Recombinant): What You Need to Know Many Vaccine Information Statements are available in Khmer and other languages. See www.immunize.org/vis Hojas de información sobre vacunas están disponibles en español y en muchos otros idiomas. Visite www.immunize.org/vis 1. Why get vaccinated? Influenza vaccine can prevent influenza (flu). Flu is a contagious disease that spreads around the [facility] every year, usually between October and May. Anyone can get the flu, but it is more dangerous for some people. Infants and young children, people 72years of age and older, pregnant women, and people with certain health conditions or a weakened immune system are at greatest risk of flu complications. Pneumonia, bronchitis, sinus infections and ear infections are examples of flu-related complications. If you have a medical condition, such as heart disease, cancer or diabetes, flu can make it worse. Flu can cause fever and chills, sore throat, muscle aches, fatigue, cough, headache, and runny or stuffy nose. Some people may have vomiting and diarrhea, though this is more common in children than adults. Each year thousands of people in the [facility] die from flu, and many more are hospitalized. Flu vaccine prevents millions of illnesses and flu-related visits to the doctor each year. 2. Influenza vaccines CDC recommends everyone 10months of age and older get vaccinated every flu season. Children 6 months through 6years of age may need 2 doses during a single flu season. Everyone else needs only 1 dose each flu season. It takes about 2 weeks for protection to develop after vaccination. There are many flu viruses, and they are always changing. Each year a new flu vaccine is made to protect against three or four viruses that are likely to cause disease in the upcoming flu season.  Even when the vaccine doesnt exactly match these viruses, it may still provide some protection. Influenza vaccine does not cause flu. Influenza vaccine may be given at the same time as other vaccines. 3. Talk with your health care provider Tell your vaccine provider if the person getting the vaccine: 
 Has had an allergic reaction after a previous dose of influenza vaccine, or has any severe, life-threatening allergies.  Has ever had Guillain-Barré Syndrome (also called GBS). In some cases, your health care provider may decide to postpone influenza vaccination to a future visit. People with minor illnesses, such as a cold, may be vaccinated. People who are moderately or severely ill should usually wait until they recover before getting influenza vaccine. Your health care provider can give you more information. 4. Risks of a reaction  Soreness, redness, and swelling where shot is given, fever, muscle aches, and headache can happen after influenza vaccine.  There may be a very small increased risk of Guillain-Barré Syndrome (GBS) after inactivated influenza vaccine (the flu shot). [facility] children who get the flu shot along with pneumococcal vaccine (PCV13), and/or DTaP vaccine at the same time might be slightly more likely to have a seizure caused by fever. Tell your health care provider if a child who is getting flu vaccine has ever had a seizure. People sometimes faint after medical procedures, including vaccination. Tell your provider if you feel dizzy or have vision changes or ringing in the ears. As with any medicine, there is a very remote chance of a vaccine causing a severe allergic reaction, other serious injury, or death. 5. What if there is a serious problem? An allergic reaction could occur after the vaccinated person leaves the clinic.  If you see signs of a severe allergic reaction (hives, swelling of the face and throat, difficulty breathing, a fast heartbeat, dizziness, or weakness), call 9-1-1 and get the person to the nearest hospital. 
 
For other signs that concern you, call your health care provider. Adverse reactions should be reported to the Vaccine Adverse Event Reporting System (VAERS). Your health care provider will usually file this report, or you can do it yourself. Visit the VAERS website at www.vaers. hhs.gov or call 0-622.209.3506. VAERS is only for reporting reactions, and VAERS staff do not give medical advice. 6. The National Vaccine Injury Compensation Program 
 
The [facility] Vaccine Injury Compensation Program (VICP) is a federal program that was created to compensate people who may have been injured by certain vaccines. Visit the VICP website at www.[facility].gov/vaccinecompensation or call 7-998.389.4153 to learn about the program and about filing a claim. There is a time limit to file a claim for compensation. 7. How can I learn more?  Ask your health care provider.  Call your local or state health department.  Contact the Centers for Disease Control and Prevention (CDC): 
- Call 8-930.431.9933 (1-800-CDC-INFO) or 
- Visit CDCs influenza website at www.cdc.gov/flu Vaccine Information Statement (Interim) Inactivated Influenza Vaccine 8/15/2019 
[address] PH[address]anda [address] [id number] Department of Health and [place] Centers for Disease Control and Prevention Office Use Only Stopping Smoking: Care Instructions Your Care Instructions Cigarette smokers crave the nicotine in cigarettes. Giving it up is much harder than simply changing a habit. Your body has to stop craving the nicotine. It is hard to quit, but you can do it. There are many tools that people use to quit smoking. You may find that combining tools works best for you. There are several steps to quitting. First you get ready to quit. Then you get support to help you.  After that, you learn new skills and behaviors to become a nonsmoker. For many people, a necessary step is getting and using medicine. Your doctor will help you set up the plan that best meets your needs. You may want to attend a smoking cessation program to help you quit smoking. When you choose a program, look for one that has proven success. Ask your doctor for ideas. You will greatly increase your chances of success if you take medicine as well as get counseling or join a cessation program. 
Some of the changes you feel when you first quit tobacco are uncomfortable. Your body will miss the nicotine at first, and you may feel short-tempered and grumpy. You may have trouble sleeping or concentrating. Medicine can help you deal with these symptoms. You may struggle with changing your smoking habits and rituals. The last step is the tricky one: Be prepared for the smoking urge to continue for a time. This is a lot to deal with, but keep at it. You will feel better. Follow-up care is a key part of your treatment and safety. Be sure to make and go to all appointments, and call your doctor if you are having problems. It's also a good idea to know your test results and keep a list of the medicines you take. How can you care for yourself at home? · Ask your family, friends, and coworkers for support. You have a better chance of quitting if you have help and support. · Join a support group, such as Nicotine Anonymous, for people who are trying to quit smoking. · Consider signing up for a smoking cessation program, such as the American Lung Association's Freedom from Smoking program. 
· Get text messaging support. Go to the website at www.smokefree. gov to sign up for the [facility] program. 
· Set a quit date. Pick your date carefully so that it is not right in the middle of a big deadline or stressful time. Once you quit, do not even take a puff.  Get rid of all ashtrays and lighters after your last cigarette. Clean your house and your clothes so that they do not smell of smoke. · Learn how to be a nonsmoker. Think about ways you can avoid those things that make you reach for a cigarette. ? Avoid situations that put you at greatest risk for smoking. For some people, it is hard to have a drink with friends without smoking. For others, they might skip a coffee break with coworkers who smoke. ? Change your daily routine. Take a different route to work or eat a meal in a different place. · Cut down on stress. Calm yourself or release tension by doing an activity you enjoy, such as reading a book, taking a hot bath, or gardening. · Talk to your doctor or pharmacist about nicotine replacement therapy, which replaces the nicotine in your body. You still get nicotine but you do not use tobacco. Nicotine replacement products help you slowly reduce the amount of nicotine you need. These products come in several forms, many of them available over-the-counter: ? Nicotine patches ? Nicotine gum and lozenges ? Nicotine inhaler · Ask your doctor about bupropion (Wellbutrin) or varenicline (Chantix), which are prescription medicines. They do not contain nicotine. They help you by reducing withdrawal symptoms, such as stress and anxiety. · Some people find hypnosis, acupuncture, and massage helpful for ending the smoking habit. · Eat a healthy diet and get regular exercise. Having healthy habits will help your body move past its craving for nicotine. · Be prepared to keep trying. Most people are not successful the first few times they try to quit. Do not get mad at yourself if you smoke again. Make a list of things you learned and think about when you want to try again, such as next week, next month, or next year. Where can you learn more? Go to http://marielena-kaley.info/. Enter S794 in the search box to learn more about \"Stopping Smoking: Care Instructions. \" Current as of: September 26, 2018 Content Version: 12.2 © 6129-7879 [employer]. Care instructions adapted under license by [employer] (which disclaims liability or warranty for this information). If you have questions about a medical condition or this instruction, always ask your healthcare professional. [address] any warranty or liability for your use of this information.

## 2019-10-28 NOTE — PROGRESS NOTES
Chief Complaint   Patient presents with    Nicotine Dependence     follow up smoking cessation, refill chantix        1. Have you been to the ER, urgent care clinic since your last visit? Hospitalized since your last visit? No    2. Have you seen or consulted any other health care providers outside of the [address] [place] [address] since your last visit? Include any pap smears or colon screening.  No

## 2019-11-01 LAB
HEMOCCULT STL QL IA: NEGATIVE
SPECIMEN STATUS REPORT, ROLRST: NORMAL

## 2019-11-18 ENCOUNTER — TELEPHONE (OUTPATIENT)
Dept: FAMILY MEDICINE CLINIC | Age: 59
End: 2019-11-18

## 2019-11-18 NOTE — TELEPHONE ENCOUNTER
----- Message from Traci Espinosa sent at 11/18/2019 10:00 AM EST -----  Regarding: Dr. Justen Kraft Telephone   Contact: [phone]  Caller's first and last name: n/a  Reason for call: Pt called about the in home colonoscopy sample. Pt was asking about the dates for the procedure.    Callback required yes/no and why: yes   Best contact number(s): [phone]  Details to clarify the request: n/a

## 2020-01-31 NOTE — PROGRESS NOTES
HPI  Rachelle Ellis 61 y.o. female  presents to the office today for follow up on smoking cessation. Blood pressure 137/89, pulse 65, temperature 97.8 °F (36.6 °C), temperature source Oral, resp. rate 19, height 5' 7\" (1.702 m), weight 186 lb 3.2 oz (84.5 kg), SpO2 (!) 86 %. Body mass index is 29.16 kg/m². Chief Complaint   Patient presents with    Nicotine Dependence     follow up smoking cessation, refill chantix         Tobacco abuse: The patient was counseled on the dangers of tobacco use, and was advised to quit. Pt has been taking Chantix starter yuri 0.5 mg (11)-1 mg (42) for smoking cessation and states that the medication seems to be helpful. Pt states that she has been trying to reduce the number of cigarettes daily, but when she becomes tensed, she would smoke more. Hypothyroidism: Pt's TSH level was 7.770 on 10/2/19. Pt is currently taking Synthroid 125 mcg every AM. Pt admits that she has been feeling fatigue and has little energy. Hypertension: BP at office today 149/83 and remained high on manual recheck. Pt continues with Lisinopril 20 mg/d; admits that she has not taken her BP medication prior to office visit, which might explain elevated BP. Pt's BP readings are usually well controled. Major depressive disorder: Pt states that she is still \"a little wound up and overwhelmed\". Pt is currently taking Cymbalta 30 mg/d. Health Maintenance: Pt will receive flu vaccine today in office. Pt is due for mammogram and PAP. Discussed with pt that I can do it or have someone else in the office to do it. Pt shows hesitance as pt thinks that her \"sexual life is over\"; admits to experiencing vaginal dryness. Pt complains pinching back pain, which affects her sleep since she has to turn her body frequently to adjust position to get more comfortable. Pt has tried Tylenol and Aleve for pain management.        Current Outpatient Medications   Medication Sig Dispense Refill    varenicline (CHANTIX) 1 mg tablet Take 1 Tab by mouth two (2) times a day. 180 Tab 0    levothyroxine (SYNTHROID) 137 mcg tablet Take 137 mcg by mouth Daily (before breakfast). 30 Tab 2    lisinopril (PRINIVIL, ZESTRIL) 20 mg tablet TAKE 1 TABLET BY MOUTH ONCE DAILY 90 Tab 0    ezetimibe (ZETIA) 10 mg tablet Take 1 Tab by mouth daily. 90 Tab 1    atorvastatin (LIPITOR) 80 mg tablet Take 1 Tab by mouth daily. 30 Tab 5    DULoxetine (CYMBALTA) 30 mg capsule Take 1 Cap by mouth daily. 30 Cap 5    multivitamin (ONE A DAY) tablet Take 1 Tab by mouth daily.  cholecalciferol (VITAMIN D3) 1,000 unit tablet Take  by mouth daily.          Allergies   Allergen Reactions    Pcn [Penicillins] Hives, Shortness of Breath and Itching    Bactrim [Sulfamethoprim Ds] Unable to Obtain     Past Medical History:   Diagnosis Date    AC (acromioclavicular) joint bone spurs     Acid reflux     Anemia NEC     Arthritis     right arm     Cancer ([facility] [address]     thyroid    Chronic pain     Depression     High cholesterol     Incontinence of urine     MDD (major depressive disorder)     Multiple thyroid nodules     Other ill-defined conditions(799.89)     right shoulder pain    Sleep apnea 2013    Thyroid disease      Past Surgical History:   Procedure Laterality Date    HX BREAST BIOPSY Right     US; neg    HX  SECTION      HX HERNIA REPAIR      HX HYSTERECTOMY       Family History   Problem Relation Age of Onset    Hypertension Mother    [de-identified] Arthritis-rheumatoid Mother     Cancer Father         colon    Diabetes Father     Cancer Sister         breast cancer    Diabetes Sister     Thyroid Disease Sister         thyroid cancer    Breast Cancer Sister 64    Hypertension Brother     Cancer Brother         colon    Cancer Sister         thyroid    Seizures Sister     Cancer Paternal Grandfather         prostate      Social History     Tobacco Use    Smoking status: Current Every Day Smoker Years: 30.00     Last attempt to quit: 2013     Years since quittin.4    Smokeless tobacco: Never Used   Substance Use Topics    Alcohol use: No        Review of Systems   Constitutional: Negative for chills and fever. HENT: Negative for hearing loss and tinnitus. Eyes: Negative for blurred vision and double vision. Respiratory: Negative for cough and shortness of breath. Cardiovascular: Negative for chest pain and palpitations. Gastrointestinal: Negative for nausea and vomiting. Genitourinary: Negative for dysuria and frequency. Musculoskeletal: Negative for back pain and falls. Skin: Negative for itching and rash. Neurological: Negative for dizziness, loss of consciousness and headaches. Psychiatric/Behavioral: Positive for depression. The patient is not nervous/anxious. Physical Exam   Constitutional: She is oriented to person, place, and time. She appears well-developed and well-nourished. HENT:   Head: Normocephalic and atraumatic. Right Ear: External ear normal.   Left Ear: External ear normal.   Nose: Nose normal.   Mouth/Throat: Oropharynx is clear and moist.   Eyes: Conjunctivae and EOM are normal.   Neck: Normal range of motion. Neck supple. Cardiovascular: Normal rate, regular rhythm, normal heart sounds and intact distal pulses. Pulmonary/Chest: Effort normal and breath sounds normal.   Abdominal: Soft. Bowel sounds are normal.   Musculoskeletal: Normal range of motion. Neurological: She is alert and oriented to person, place, and time. Skin: Skin is warm and dry. Psychiatric: She has a normal mood and affect. Her behavior is normal. Judgment and thought content normal.   Nursing note and vitals reviewed. ASSESSMENT and PLAN  Diagnoses and all orders for this visit:    1. Tobacco abuse  The patient was counseled on the dangers of tobacco use, and was advised to quit.   Reviewed strategies to maximize success, including stress management, support of family/friends and written materials. Pt will now take Chantix 1 mg/BID for tobacco cessation.   -     varenicline (CHANTIX) 1 mg tablet; Take 1 Tab by mouth two (2) times a day. 2. Acquired hypothyroidism  Presumed stable, will assess levels today. Pt will now take Synthroid 137 mcg/d.   -     levothyroxine (SYNTHROID) 137 mcg tablet; Take 137 mcg by mouth Daily (before breakfast). -     TSH 3RD GENERATION; Future    3. Essential hypertension  BP elevated today in office because pt did not take her BP medication prior to visit. Advised pt to continue with Lisinopril 20 mg/d.    4. Screening mammogram, encounter for  Pt will let me know if she is interested in getting mammogram and PAP.   -     [facility] MAMMO BI SCREENING INCL CAD; Future    5. Major depressive disorder, recurrent episode, moderate (HCC)  Pt continues with Cymbalta 30 mg/d. 6. Encounter for immunization  Flu vaccine administered today in office.   -     INFLUENZA VIRUS VAC QUAD,SPLIT,PRESV FREE SYRINGE IM  -     OH IMMUNIZ ADMIN,1 SINGLE/COMB VAC/TOXOID    Follow-up and Dispositions    · Return in about 2 months (around 12/28/2019) for chantix follow up. Medication risks/benefits/costs/interactions/alternatives discussed with patient. Advised patient to call back or return to office if symptoms worsen/change/persist.  If patient cannot reach us or should anything more severe/urgent arise he/she should proceed directly to the nearest emergency department. Discussed expected course/resolution/complications of diagnosis in detail with patient. Patient given a written after visit summary which includes her diagnoses, current medications and vitals. Patient expressed understanding with the diagnosis and plan. Written by paulina Booth, as dictated by Iqra Underwood M.D.    11:59 AM - 12:17 PM    Total time spent with the patient 18 minutes, greater than 50% of time spent counseling patient. no

## 2020-03-09 DIAGNOSIS — E03.9 ACQUIRED HYPOTHYROIDISM: ICD-10-CM

## 2020-03-12 RX ORDER — LEVOTHYROXINE SODIUM 137 UG/1
TABLET ORAL
Qty: 30 TAB | Refills: 0 | Status: SHIPPED | OUTPATIENT
Start: 2020-03-12 | End: 2020-04-20 | Stop reason: SDUPTHER

## 2020-04-17 ENCOUNTER — TELEPHONE (OUTPATIENT)
Dept: FAMILY MEDICINE CLINIC | Age: 60
End: 2020-04-17

## 2020-04-17 NOTE — TELEPHONE ENCOUNTER
[phone] notified patient needs virtual visit patient has appointment Monday, April 20, 2020 09:00 AM patient understand

## 2020-04-17 NOTE — TELEPHONE ENCOUNTER
----- Message from Gail Mercado sent at 4/17/2020 12:54 PM EDT -----  Regarding: Dr. Mariela Houston Telephone  Contact: [phone]  Caller's first and last name: Pt  Reason for call: would like to speak with Nurse before making appt to have medications refilled  Callback required yes/no and why: y  Best contact number(s): [phone]  Details to clarify the request: n/a

## 2020-04-20 ENCOUNTER — VIRTUAL VISIT (OUTPATIENT)
Dept: FAMILY MEDICINE CLINIC | Age: 60
End: 2020-04-20

## 2020-04-20 VITALS
DIASTOLIC BLOOD PRESSURE: 84 MMHG | BODY MASS INDEX: 28.88 KG/M2 | SYSTOLIC BLOOD PRESSURE: 134 MMHG | HEIGHT: 67 IN | WEIGHT: 184 LBS

## 2020-04-20 DIAGNOSIS — G89.29 CHRONIC BILATERAL LOW BACK PAIN WITHOUT SCIATICA: ICD-10-CM

## 2020-04-20 DIAGNOSIS — E66.3 OVERWEIGHT: ICD-10-CM

## 2020-04-20 DIAGNOSIS — E55.9 VITAMIN D DEFICIENCY: ICD-10-CM

## 2020-04-20 DIAGNOSIS — M54.50 CHRONIC BILATERAL LOW BACK PAIN WITHOUT SCIATICA: ICD-10-CM

## 2020-04-20 DIAGNOSIS — I10 ESSENTIAL HYPERTENSION: ICD-10-CM

## 2020-04-20 DIAGNOSIS — Z72.0 TOBACCO ABUSE: ICD-10-CM

## 2020-04-20 DIAGNOSIS — E03.9 ACQUIRED HYPOTHYROIDISM: Primary | ICD-10-CM

## 2020-04-20 DIAGNOSIS — E78.5 HYPERLIPIDEMIA, UNSPECIFIED HYPERLIPIDEMIA TYPE: ICD-10-CM

## 2020-04-20 DIAGNOSIS — F33.1 MAJOR DEPRESSIVE DISORDER, RECURRENT EPISODE, MODERATE (HCC): ICD-10-CM

## 2020-04-20 DIAGNOSIS — F41.9 ANXIETY: ICD-10-CM

## 2020-04-20 DIAGNOSIS — Z12.31 SCREENING MAMMOGRAM, ENCOUNTER FOR: ICD-10-CM

## 2020-04-20 RX ORDER — LEVOTHYROXINE SODIUM 137 UG/1
TABLET ORAL
Qty: 90 TAB | Refills: 1 | Status: SHIPPED | OUTPATIENT
Start: 2020-04-20 | End: 2020-08-25 | Stop reason: SDUPTHER

## 2020-04-20 RX ORDER — MELATONIN
1000 DAILY
Qty: 90 TAB | Refills: 1 | Status: SHIPPED | OUTPATIENT
Start: 2020-04-20 | End: 2021-02-08 | Stop reason: SDUPTHER

## 2020-04-20 RX ORDER — VARENICLINE TARTRATE 1 MG/1
1 TABLET, FILM COATED ORAL 2 TIMES DAILY
Qty: 180 TAB | Refills: 0 | Status: SHIPPED | OUTPATIENT
Start: 2020-04-20 | End: 2020-12-07

## 2020-04-20 RX ORDER — EZETIMIBE 10 MG/1
10 TABLET ORAL DAILY
Qty: 90 TAB | Refills: 1 | Status: SHIPPED | OUTPATIENT
Start: 2020-04-20 | End: 2020-12-07

## 2020-04-20 RX ORDER — ATORVASTATIN CALCIUM 80 MG/1
80 TABLET, FILM COATED ORAL DAILY
Qty: 30 TAB | Refills: 5 | Status: SHIPPED | OUTPATIENT
Start: 2020-04-20 | End: 2020-11-25

## 2020-04-20 RX ORDER — DULOXETIN HYDROCHLORIDE 30 MG/1
30 CAPSULE, DELAYED RELEASE ORAL DAILY
Qty: 30 CAP | Refills: 5 | Status: SHIPPED | OUTPATIENT
Start: 2020-04-20 | End: 2021-02-08 | Stop reason: SDUPTHER

## 2020-04-20 RX ORDER — LISINOPRIL 20 MG/1
TABLET ORAL
Qty: 90 TAB | Refills: 0 | Status: SHIPPED | OUTPATIENT
Start: 2020-04-20 | End: 2020-09-02

## 2020-04-20 RX ORDER — BISMUTH SUBSALICYLATE 262 MG
1 TABLET,CHEWABLE ORAL DAILY
Qty: 90 TAB | Refills: 1 | Status: SHIPPED | OUTPATIENT
Start: 2020-04-20

## 2020-04-20 NOTE — PROGRESS NOTES
Chief Complaint   Patient presents with    Hypertension    Hypothyroidism    Nicotine Dependence    Annual Wellness Visit       Reviewed Record in preparation for visit and have obtained necessary documentation. Identified pt with two pt identifiers (Name @ )    Health Maintenance Due   Topic    DTaP/Tdap/Td series (1 - Tdap)    PAP AKA CERVICAL CYTOLOGY     Shingrix Vaccine Age 50> (1 of 2)    Breast Cancer Screen Mammogram     Medicare Yearly Exam          1. Have you been to the ER, urgent care clinic since your last visit? Hospitalized since your last visit? no    2. Have you seen or consulted any other health care providers outside of the [address] [place] [address] since your last visit? Include any pap smears or colon screening.  No

## 2020-04-20 NOTE — PROGRESS NOTES
Yamileth Sawant is a 61 y.o. female who was seen by synchronous (real-time) audio-video technology on 4/20/2020. Consent:  Services were provided through a video synchronous discussion virtually to substitute for in-person appointment. She and/or her healthcare decision maker is aware that this patient-initiated Telehealth encounter is a billable service, with coverage as determined by her insurance carrier. She is aware that she may receive a bill and has provided verbal consent to proceed: Yes    I was in the office while conducting this encounter. Subjective:   Yamileth Sawant was seen for AWV. Hypertension: Pt continues with Lisinopril 20 mg/d. Pt states that her BP readings are mostly stable, with some occasional slight elevation. For example, one day pt was cooking in the kitchen when she suddenly felt \"pounding in the head\". Pt checked her BP and it was about 135/86. Hyperlipidemia: Lipid panel on 10/2/19 notable for total cholesterol 196, HDL 70, , and triglycerides 106. Pt continues with Zetia 10 mg/d and Atorvastatin 80 mg/d. Hypothyroidism: Pt's TSH level was 7.770 on 10/2/19. Pt continues with Synthroid 137 mcg/d. Tobacco abuse/Anxiety/Major depressive disorder: Pt reports that her sister recently passed away, which caused her to resume smoking. Pt is also taking care of her mother without any help from other family members. Pt states that she feels \"over the top\", \"overwhelmed\", and experiences crying spells, but she will feel better with one smoke. Pt pretty much has the same routine almost everyday, but she does try to rest whenever she can. Pt also admits to changes in appetite, for example eating more before going to bed. Pt also thinks that she may be \"sleep-eating\". Pt is currently on Chantix but also inquires about counseling for her tobacco abuse. Overweight: I have reviewed/discussed the above normal BMI with the patient.      Vitamin D deficiency: Pt's vitamin D levels were 19.3 on 10/2/19. Pt continues with Cholecalciferol. Chronic back pain: Pt continues with Cymbalta 30 mg/d. Health Maintenance: Medicare questionnaire discussed and responses reviewed today in office. Prior to Admission medications    Medication Sig Start Date End Date Taking? Authorizing Provider   multivitamin (ONE A DAY) tablet Take 1 Tab by mouth daily. 4/20/20  Yes Luisito Alaniz MD   levothyroxine (SYNTHROID) 137 mcg tablet TAKE 1 TABLET BY MOUTH ONCE DAILY BEFORE BREAKFAST 4/20/20  Yes Luisito Alaniz MD   varenicline (CHANTIX) 1 mg tablet Take 1 Tab by mouth two (2) times a day. 4/20/20  Yes Luisito Alaniz MD   lisinopriL (PRINIVIL, ZESTRIL) 20 mg tablet TAKE 1 TABLET BY MOUTH ONCE DAILY 4/20/20  Yes Luisito Alaniz MD   ezetimibe (ZETIA) 10 mg tablet Take 1 Tab by mouth daily. 4/20/20  Yes Luisito Alaniz MD   atorvastatin (LIPITOR) 80 mg tablet Take 1 Tab by mouth daily. 4/20/20  Yes Luisito Alaniz MD   DULoxetine (CYMBALTA) 30 mg capsule Take 1 Cap by mouth daily. 4/20/20  Yes Luisito Alaniz MD   cholecalciferol (Vitamin D3) (1000 Units /25 mcg) tablet Take 1 Tab by mouth daily. 4/20/20  Yes Luisito Alaniz MD   levothyroxine (SYNTHROID) 137 mcg tablet TAKE 1 TABLET BY MOUTH ONCE DAILY BEFORE BREAKFAST 3/12/20 4/20/20  Danae Foster MD   varenicline (CHANTIX) 1 mg tablet Take 1 Tab by mouth two (2) times a day. 10/28/19 4/20/20  Danae Foster MD   lisinopril (PRINIVIL, ZESTRIL) 20 mg tablet TAKE 1 TABLET BY MOUTH ONCE DAILY 10/2/19 4/20/20  Danae Foster MD   ezetimibe (ZETIA) 10 mg tablet Take 1 Tab by mouth daily. 10/2/19 4/20/20  Danae Foster MD   atorvastatin (LIPITOR) 80 mg tablet Take 1 Tab by mouth daily. 10/2/19 4/20/20  Danae Foster MD   DULoxetine (CYMBALTA) 30 mg capsule Take 1 Cap by mouth daily. 10/2/19 4/20/20  Danae Foster MD   cholecalciferol (VITAMIN D3) 1,000 unit tablet Take  by mouth daily.     4/20/20  Provider, Historical   multivitamin (ONE A DAY) tablet Take 1 Tab by mouth daily. 20  Provider, Historical     Allergies   Allergen Reactions    Pcn [Penicillins] Hives, Shortness of Breath and Itching    Bactrim [Sulfamethoprim Ds] Unable to Obtain     Past Medical History:   Diagnosis Date    AC (acromioclavicular) joint bone spurs     Acid reflux     Anemia NEC     Arthritis     right arm     Cancer ([address]     thyroid    Chronic pain     Depression     High cholesterol     Incontinence of urine     MDD (major depressive disorder)     Multiple thyroid nodules     Other ill-defined conditions(799.89)     right shoulder pain    Sleep apnea 2013    Thyroid disease      Past Surgical History:   Procedure Laterality Date    HX BREAST BIOPSY Right 2009    US; neg    HX  SECTION      HX HERNIA REPAIR      HX HYSTERECTOMY       Family History   Problem Relation Age of Onset    Hypertension Mother    [facility] Arthritis-rheumatoid Mother     Cancer Father         colon    Diabetes Father     Cancer Sister         breast cancer    Diabetes Sister     Thyroid Disease Sister         thyroid cancer    Breast Cancer Sister 64    Hypertension Brother     Cancer Brother         colon    Cancer Sister         thyroid    Seizures Sister     Cancer Paternal Grandfather         prostate      Social History     Tobacco Use    Smoking status: Current Every Day Smoker     Years: 30.00     Last attempt to quit: 2013     Years since quittin.8    Smokeless tobacco: Never Used    Tobacco comment: 6-7 CIGS PER DAY   Substance Use Topics    Alcohol use: No        Review of Systems   Constitutional: Negative for chills and fever. HENT: Negative for hearing loss and tinnitus. Eyes: Negative for blurred vision and double vision. Respiratory: Negative for cough and shortness of breath. Cardiovascular: Negative for chest pain and palpitations.    Gastrointestinal: Negative for nausea and vomiting. Genitourinary: Negative for dysuria and frequency. Musculoskeletal: Positive for back pain. Negative for falls. Skin: Negative for itching and rash. Neurological: Negative for dizziness, loss of consciousness and headaches. Psychiatric/Behavioral: Positive for depression. The patient is nervous/anxious. PHYSICAL EXAMINATION:  Vital Signs: (As obtained by patient/caregiver at home)  Visit Vitals  /84   Ht 5' 7\" (1.702 m)   Wt 184 lb (83.5 kg)   BMI 28.82 kg/m²        Constitutional: [x] Appears well-developed and well-nourished [x] No apparent distress      [] Abnormal -     Mental status: [x] Alert and awake  [x] Oriented to person/place/time [x] Able to follow commands    [] Abnormal -     Eyes:   EOM    [x]  Normal    [] Abnormal -   Sclera  [x]  Normal    [] Abnormal -          Discharge [x]  None visible   [] Abnormal -     HENT: [x] Normocephalic, atraumatic  [] Abnormal -   [x] Mouth/Throat: Mucous membranes are moist    External Ears [x] Normal  [] Abnormal -    Neck: [x] No visualized mass [] Abnormal -     Pulmonary/Chest: [x] Respiratory effort normal   [x] No visualized signs of difficulty breathing or respiratory distress        [] Abnormal -      Musculoskeletal:   [x] Normal gait with no signs of ataxia         [x] Normal range of motion of neck        [] Abnormal -     Neurological:        [x] No Facial Asymmetry (Cranial nerve 7 motor function) (limited exam due to video visit)          [x] No gaze palsy        [] Abnormal -          Skin:        [x] No significant exanthematous lesions or discoloration noted on facial skin         [] Abnormal -            Psychiatric:       [x] Normal Affect [] Abnormal -        [x] No Hallucinations    Other pertinent observable physical exam findings:-none    Assessment & Plan:   Diagnoses and all orders for this visit:    1. Acquired hypothyroidism  Presumed stable, will assess levels in the future.  Pt continues with Synthroid 137 mcg/d.   -     TSH 3RD GENERATION; Future  -     T4, FREE; Future  -     levothyroxine (SYNTHROID) 137 mcg tablet; TAKE 1 TABLET BY MOUTH ONCE DAILY BEFORE BREAKFAST    2. Tobacco abuse  Resumed smoking due to recent loss of her sister. Feeling overwhelmed in everyday life, mostly due to having to take care of her mother without any help from other family members. Pt continues with Chantix. Advised pt to see our [address] [place] [address] for assistance with tobacco abuse, anxiety and depression.   -     REFERRAL TO SOCIAL WORK  -     varenicline (CHANTIX) 1 mg tablet; Take 1 Tab by mouth two (2) times a day. 3. Essential hypertension  BP readings mostly stable at home with some occasional elevation, per pt. Pt continues with Lisinopril 20 mg/d. Will check CMP in the future. -     METABOLIC PANEL, COMPREHENSIVE; Future  -     lisinopriL (PRINIVIL, ZESTRIL) 20 mg tablet; TAKE 1 TABLET BY MOUTH ONCE DAILY    4. Hyperlipidemia, unspecified hyperlipidemia type  Presumed stable, will assess levels in the future. Pt continues with Zetia 10 mg/d and Atorvastatin 80 mg/d. -     METABOLIC PANEL, COMPREHENSIVE; Future  -     LIPID PANEL; Future  -     ezetimibe (ZETIA) 10 mg tablet; Take 1 Tab by mouth daily. -     atorvastatin (LIPITOR) 80 mg tablet; Take 1 Tab by mouth daily. 5. Overweight  I have reviewed/discussed the above normal BMI with the patient. I have recommended the following interventions: dietary management education, guidance, and counseling, encourage exercise and monitor weight . 6. Major depressive disorder, recurrent episode, moderate (HCC)  Same as #2.   -     REFERRAL TO SOCIAL WORK  -     DULoxetine (CYMBALTA) 30 mg capsule; Take 1 Cap by mouth daily. 7. Anxiety  Same as #2.   -     REFERRAL TO SOCIAL WORK  -     DULoxetine (CYMBALTA) 30 mg capsule; Take 1 Cap by mouth daily. 8. Vitamin D deficiency  Presumed stable, will assess levels in the future.  Pt continues with Cholecalciferol.   - VITAMIN D, 25 HYDROXY; Future  -     cholecalciferol (Vitamin D3) (1000 Units /25 mcg) tablet; Take 1 Tab by mouth daily. 9. Chronic bilateral low back pain without sciatica  Pt continues with Cymbalta 30 mg/d.   -     DULoxetine (CYMBALTA) 30 mg capsule; Take 1 Cap by mouth daily. 10. Screening mammogram, encounter for  -     [facility] MAMMO BI SCREENING INCL CAD; Future    Other orders  -     multivitamin (ONE A DAY) tablet; Take 1 Tab by mouth daily. Medicare questionnaire discussed and responses reviewed today in office. Follow-up and Dispositions    · Return in about 6 months (around 10/20/2020) for hyperlipidemia follow up, hypertension follow up. We discussed the expected course, resolution and complications of the diagnosis(es) in detail. Medication risks, benefits, costs, interactions, and alternatives were discussed as indicated. I advised her to contact the office if her condition worsens, changes or fails to improve as anticipated. She expressed understanding with the diagnosis(es) and plan. Pursuant to the emergency declaration under the [address] [place] [address] waiver authority and the [employer] Act, this Virtual Visit was conducted, with patient's consent, to reduce the patient's risk of exposure to COVID-19 and provide continuity of care for an established patient. Services were provided through a video synchronous discussion virtually to substitute for in-person clinic visit. Written by Lazarus Frederickson, scibe, as dictated by Dara Hernández M.D.    9:13 AM - 9:25 AM    Total time spent with the patient 12 minutes, greater than 50% of time spent counseling patient.

## 2020-04-20 NOTE — PROGRESS NOTES
This is the Subsequent Medicare Annual Wellness Exam, performed 12 months or more after the Initial AWV or the last Subsequent AWV    I have reviewed the patient's medical history in detail and updated the computerized patient record.      History     Patient Active Problem List   Diagnosis Code    Anemia D64.9    Hyperlipidemia E78.5    Vitamin D deficiency E55.9    Anxiety F41.9    Depression F32.9    Trochanteric bursitis M70.60    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    Hip pain, right M25.551    Nodule of right lung R91.1    Right shoulder pain M25.511    Anxiety disorder F41.9    Major depressive disorder F32.9    H/O papillary adenocarcinoma of thyroid Z85.850    Post-surgical hypothyroidism E89.0    Major depressive disorder, recurrent episode, moderate (HCC) F33.1    Sleep apnea G47.30    MDD (major depressive disorder) F32.9    Diffusion capacity of lung (dl), decreased R94.2    Right leg pain M79.604    Degeneration of lumbar or lumbosacral intervertebral disc M51.37    Chronic back pain M54.9, G89.29    Lumbar facet arthropathy M47.816    Lumbar degenerative disc disease M51.36    Obesity, Class I, BMI 30-34.9 E66.9    Essential hypertension I10    Acquired hypothyroidism E03.9     Past Medical History:   Diagnosis Date    AC (acromioclavicular) joint bone spurs     Acid reflux     Anemia NEC     Arthritis     right arm     Cancer (HCC)     thyroid    Chronic pain     Depression     High cholesterol     Incontinence of urine     MDD (major depressive disorder) 2013    Multiple thyroid nodules     Other ill-defined conditions(799.89)     right shoulder pain    Sleep apnea 2013    Thyroid disease       Past Surgical History:   Procedure Laterality Date    HX BREAST BIOPSY Right 2009    US; neg    HX  SECTION      HX HERNIA REPAIR      HX HYSTERECTOMY       Current Outpatient Medications   Medication Sig Dispense Refill    levothyroxine (SYNTHROID) 137 mcg tablet TAKE 1 TABLET BY MOUTH ONCE DAILY BEFORE BREAKFAST 30 Tab 0    lisinopril (PRINIVIL, ZESTRIL) 20 mg tablet TAKE 1 TABLET BY MOUTH ONCE DAILY 90 Tab 0    ezetimibe (ZETIA) 10 mg tablet Take 1 Tab by mouth daily. 90 Tab 1    atorvastatin (LIPITOR) 80 mg tablet Take 1 Tab by mouth daily. 30 Tab 5    DULoxetine (CYMBALTA) 30 mg capsule Take 1 Cap by mouth daily. 30 Cap 5    varenicline (CHANTIX) 1 mg tablet Take 1 Tab by mouth two (2) times a day. 180 Tab 0    cholecalciferol (VITAMIN D3) 1,000 unit tablet Take  by mouth daily.  multivitamin (ONE A DAY) tablet Take 1 Tab by mouth daily.          Allergies   Allergen Reactions    Pcn [Penicillins] Hives, Shortness of Breath and Itching    Bactrim [Sulfamethoprim Ds] Unable to Obtain       Family History   Problem Relation Age of Onset    Hypertension Mother    [address] [facility] Arthritis-rheumatoid Mother     Cancer Father         colon    Diabetes Father     Cancer Sister         breast cancer    Diabetes Sister     Thyroid Disease Sister         thyroid cancer    Breast Cancer Sister 64    Hypertension Brother     Cancer Brother         colon    Cancer Sister         thyroid    Seizures Sister     Cancer Paternal Grandfather         prostate      Social History     Tobacco Use    Smoking status: Current Every Day Smoker     Years: 30.00     Last attempt to quit: 2013     Years since quittin.8    Smokeless tobacco: Never Used    Tobacco comment: 6-7 CIGS PER DAY   Substance Use Topics    Alcohol use: No       Depression Risk Factor Screening:     3 most recent PHQ Screens 2018   Little interest or pleasure in doing things Several days   Feeling down, depressed, irritable, or hopeless More than half the days   Total Score PHQ 2 3   Trouble falling or staying asleep, or sleeping too much More than half the days   Feeling tired or having little energy Nearly every day   Poor appetite, weight loss, or overeating Not at all   Feeling bad about yourself - or that you are a failure or have let yourself or your family down Several days   Trouble concentrating on things such as school, work, reading, or watching TV Not at all   Moving or speaking so slowly that other people could have noticed; or the opposite being so fidgety that others notice Not at all   Thoughts of being better off dead, or hurting yourself in some way Not at all   PHQ 9 Score 9   How difficult have these problems made it for you to do your work, take care of your home and get along with others Very difficult       Alcohol Risk Factor Screening:   Do you average 1 drink per night or more than 7 drinks a week:  No    On any one occasion in the past three months have you have had more than 3 drinks containing alcohol:  No      Functional Ability and Level of Safety:   Hearing: Hearing is good. Activities of Daily Living: The home contains: no safety equipment. Patient does total self care    Ambulation: with no difficulty, some issues with walking      Fall Risk:  Fall Risk Assessment, last 12 mths 11/12/2018   Able to walk? Yes   Fall in past 12 months? No       Abuse Screen:  Patient is not abused    Cognitive Screening   Has your family/caregiver stated any concerns about your memory: no      Patient Care Team   Patient Care Team:  Don Celaya MD as PCP - Les Coulter MD as PCP - [facility]  Reba Gregory MD (Otolaryngology)  Rayna Jones MD as Consulting Provider (Endocrinology)    Assessment/Plan   Education and counseling provided:  Are appropriate based on today's review and evaluation    Diagnoses and all orders for this visit:    1. Acquired hypothyroidism    2. Tobacco abuse    3. Essential hypertension    4. Hyperlipidemia, unspecified hyperlipidemia type    5. Overweight    6. Major depressive disorder, recurrent episode, moderate (HCC)    7. Anxiety    8.  Screening mammogram, encounter for  -     [facility] MAMMO BI SCREENING INCL CAD;  Future        Health Maintenance Due   Topic Date Due    DTaP/Tdap/Td series (1 - Tdap) 10/08/1981    PAP AKA CERVICAL CYTOLOGY  10/08/1981    Shingrix Vaccine Age 50> (1 of 2) 10/08/2010    Breast Cancer Screen Mammogram  11/06/2018    Medicare Yearly Exam  11/13/2019

## 2020-04-28 ENCOUNTER — VIRTUAL VISIT (OUTPATIENT)
Dept: FAMILY MEDICINE CLINIC | Age: 60
End: 2020-04-28

## 2020-04-28 DIAGNOSIS — F33.1 MAJOR DEPRESSIVE DISORDER, RECURRENT EPISODE, MODERATE (HCC): Primary | ICD-10-CM

## 2020-04-28 DIAGNOSIS — F41.1 GENERALIZED ANXIETY DISORDER: ICD-10-CM

## 2020-04-28 NOTE — PROGRESS NOTES
Virtual Initial Appt. Met with this 61year old female today after a referral was made by her PCP, Dr. Helen Gann. Ms. [facility] reports anxiety with some underlying depression. She admits from the onset of this interview that she is an angry and irritable woman! It is easy to understand where these feelings stem from after hearing her history of the number of close family members she has cared for and ultimately lost--many to cancer. She is currently caring for her 80year old mother, with whom she resides. Her 35year old son is also living in the home. Ms. [facility] states she is one of 5 siblings with her father being  and only one of her siblings still alive. Ms. [facility] was  Susanne  man she  (and had her son) and the second   in  from lung cancer. She cared for him until his death. She also cared for her father-who  in  from colon cancer  and her sister  one month later from breast cancer. Her brother  in 2019, also from colon cancer. Her mother has rheumatoid arthritis but is still mentally sharp, per Ms. [facility]. Ms. ROBERTO RAMIRES looks older than her stated age. She presents with an angry tone and reports feeling lonely and isolated. She reports crying often and states that she is anxious \"all the time. \"  As a result she reports she gets about 3 hours of sleep per night. She goes to bed around midnight and is up around 3 am.  She informs this clinician that she is Baptism and listens to \"God's word\" on Sundays. She worked from the age of 12 until she herself had to go out on disability due to degenerative disc disease and stenosis in her leg. Ms. [facility] does not want to [facility] about my feelings syl that's not gonna do any good. What I want is help here. \"  Ms. ROBERTO RAMIRES would like to see if there are any services that can help watch her mother so she can get out and either do her errands or \"just take a break. \"    This clinician will look into what the office nurse navigators can provide or recommend to this patient and her situation. As she is not wanting outpatient therapy at this time and is not reporting any acute symptoms, this clinician will see what services can be offered. Ms. Gogo Cai is alert and oriented X3. She is not reporting any suicidal or homicidal ideations. She is not psychotic or delusional.  She expresses and presents with anxiety and, as stated earlier, underlying depression from the numerous family members she has lost in her life. She is not ready, nor wanting to address those issues at this time.   Juan Carlos Braswell, SELVIN

## 2020-05-01 ENCOUNTER — PATIENT OUTREACH (OUTPATIENT)
Dept: FAMILY MEDICINE CLINIC | Age: 60
End: 2020-05-01

## 2020-05-01 NOTE — PROGRESS NOTES
Social Work Note  2020      Received referral from Maria Ines Martin LCSW for assistance to connection with  services/respite care. Chart reviewed. Message left on patient's voicemail requesting return call. Received return call from patient. Identified by name and . Introduced self and explained SW role. Ms. Alex Zhu shared that she is the primary caregiver for her 80year old mother and is in need of some respite care/ care services so that she can run errands or have respite time for herself. She stated that her mother applied and was evaluated for Medicaid funded personal care. She stated that home assessment was completed and her mother did not meet the criteria for services. Ms. Alex Zhu also stated that she has contacted [employer] and spoken with Fredy Camarena.  (Ms. Alex Zhu stated that she is a former employee of [employer]). Ms. Azar Makc completed the application for a [address] Program, but has not turned in the application yet. Ms. Alex Zhu stated that she and MsJhonatan Cass decided to wait until the [facility] Emergency has lessened. Advised Ms. Alex Zhu that this SW is aware of agencies that are continuing to provide  services and will call to check on pricing/availability. Advised that rates may be higher. Ms. Alex Zhu advised that family support is limited. Suggested talking with family and using a calendar to plan respite times for the next month when family could be present. Ms. Alex Zhu stated that she has attempted this in the past without success. SW also suggested connecting with michel community to ask about assistance. Ms. Alex Zhu stated that her Taoism is mostly elderly members and she and her mother would prefer to have someone from a bonded agency provide care. SW Plan:  · SW to contact agencies to obtain estimate of  service cost at this time.   Some pricing has been higher due to COVID.  · SW to follow up with Ms. Vy Cunningham.     Bryon Grubbs, MSW, ACSW, CCM    [facility] Management Team  [phone]

## 2020-05-05 ENCOUNTER — PATIENT OUTREACH (OUTPATIENT)
Dept: FAMILY MEDICINE CLINIC | Age: 60
End: 2020-05-05

## 2020-05-05 NOTE — PROGRESS NOTES
Social Work Note  5/5/2020    Follow up call to patient to discuss available agencies for /sitter services. Concerns discussed during visit:    Depression symptoms:   SW observed patient crying during conversation and patient reported that she has had days where she does not wish to get out of bed or eat. She also stated that she has had times of where she overeats. Patient expressed feelings of anger and stress with lack of support from family/friends related to caring for her mother and grief related to recent loss of relatives, including sister. Psychosocial support provided. SW asked about Cymbalta. Patient stated that she has not been taking it because it makes her \"sleepy\" and that she is \"scared\" to take it when she is caring for her mother. Encouraged patient to take medication as prescribed. Discussed importance of self-care, including allowing son to stay with patient's mother and connecting with counselor,  for support. Assistance with care for mother:  Patient stated that she has talked with MsJhonatan Cass at [employer] and filed the application for the respite care sarah. SW advised that agencies are available for  services. Patient requested that SW follow up with Ms. Odell re: status of application and next steps. Also discussed long term options for assistance with mother's care. Options included:  rescreen for personal care services. Patient stated she cares for all needs including bathing, cooking, toileting, etc.. SW explained to patient that if she and her mother were to allow aide to assist with care, then patient's mother may qualify for services which would provide respite for patient. Patient appeared reluctant to have aide care for mother outside of  services. Advised patient that she could talk with aide to discuss how she cares for her mother, but that an aide may do it slightly differently.        1:10 pm   Message left for Rob Samayoa at [facility] ([phone]) requesting return call re: status of respite application.  Plan:  · Follow up with Rob Samayoa at [facility] re: respite care sarah  · Follow up with Dr. Pavel Cates re: patient antidepressant status.      Junior Rice MSW, ACSW, [facility]    [facility] Management Team  [phone]

## 2020-05-07 ENCOUNTER — PATIENT OUTREACH (OUTPATIENT)
Dept: FAMILY MEDICINE CLINIC | Age: 60
End: 2020-05-07

## 2020-05-07 NOTE — PROGRESS NOTES
Social Work Note  5/7/2020    Received return call from Syl Muñiz at [employer]. She advised that patient completed application for the [facility] Respite Program that provides 80 hours of sarah funded respite care. She stated that the patient's application was submitted 3/27 and that it asks for 3 hours day/3 days week. Ms. Nydia Schwab advised that no agency has picked up the referral most likely because of 1) shortage of aides during [address] emergency and 2) [place]. She advised that the application is still active.      SW to follow up with patient's daughter to discuss information and additional option of care - [facility] respite voucher program.      Sherrell Greer MSW, ACSW, [facility]    [facility] Management Team  [phone]

## 2020-05-26 ENCOUNTER — PATIENT OUTREACH (OUTPATIENT)
Dept: FAMILY MEDICINE CLINIC | Age: 60
End: 2020-05-26

## 2020-05-26 NOTE — PROGRESS NOTES
Social Work Note  2020    Follow up call to patient re: status of respite care through [employer]. Advised that application was submitted, but no agency has picked up referral as of yet. Advised that barriers may be shortage of aides due to [facility] and [place] location of patient[place]. Asked patient again whether family could assist.  She stated there is limited support. Encouraged her to talk with others about providing a few hours/week on a schedule. Patient stated that she does not trust that others will care for her mother as she does. Patient also communicated worry about financial/legal situation related to sister's .  Encouraged patient to evaluate eligibility for assistance through [address] if needed. Patient declined number at this time. SW advised patient that Dr. Kerri Blackwell would like to see her again for a virtual visit. Asked if she would like number to office to schedule and she stated that she would prefer that the office contact her. SW to pass on message. Sent message to [employer], LPN. She advised that she cannot call patient until tomorrow and encouraged patient to call and ask for appt due to Dr. Cain Turner schedule. Contacted patient and advised that nurse cannot call until tomorrow and patient was encouraged to call for visit as Dr. Kerri Blackwell will be in the office Wed  and then not back again until Mon . Patient voiced understanding. No further SW needs at this time. Patient has graduated from the [employer]  program on 20. Patient/family has the ability to self-manage. At this time all social work goals have been completed. No further social work follow up scheduled. Patient has Social Work contact information for any further questions, concerns, or needs.      Goals Addressed                 This Visit's Progress       Chronic Disease     Supportive services for caregiving        Goal:  Patient will demonstrate understanding of available community resources for assistance with care of her mother    05/26/20  Assessment:  Patient voiced understanding that respite program through [employer] was filed 3/27/20, but that no agency has picked up the referral due to shortage of aides and [place]. Offered option of applying for respite scholarship through [employer], but patient stated that she cannot afford to pay the $400 for care up front and be reimbursed. No other assistance options available at this time. Goal:  Completed. AML      05/04/20  Assessment:  Options for assistance in the home discussed with patient. Respite care sarah application filed with [facility]. Long term options for care discussed:  Personal care services.  Plan:  Follow up with [employer] re: status of respite application. AML          Patients upcoming visits:  No future appointments.      Cheri Epps, MSW, ACSW, [facility]    [facility] Management Team  [phone]

## 2020-05-27 ENCOUNTER — TELEPHONE (OUTPATIENT)
Dept: FAMILY MEDICINE CLINIC | Age: 60
End: 2020-05-27

## 2020-05-27 NOTE — TELEPHONE ENCOUNTER
----- Message from Cindy Rasheedabilly sent at 5/27/2020  9:46 AM EDT -----  Regarding: /Telephone  Patient return call    Caller's first and last name and relationship (if not the patient):      Best contact number(s):  [phone]    Whose call is being returned:  Pt not sure    Details to clarify the request:  Would like to know why she needs another VV    Sonia Olmedo      Please advice

## 2020-05-27 NOTE — TELEPHONE ENCOUNTER
----- Message from QUIN Batres sent at 5/26/2020  3:41 PM EDT -----  Regarding: RE: Virtual Visit  Thank you. I encouraged her to call. Yuan Samuel  ----- Message -----  From: Emily Pillai LPN  Sent: 9/99/4288   3:22 PM EDT  To: QUIN Batres  Subject: RE: Ted Eldridge nurse I'm in the flu clinic right now and no access to telephone I can call her tomorrow   But its best if she call the office to set up virtual visit for tomorrow with Dr. Beth Eldridge after tomorrow Dr. Beth Eldridge won't be in the office again till next Monday. Ajay Kelley  ----- Message -----  From: QUIN Gonzalez  Sent: 5/26/2020   2:35 PM EDT  To: Mehdi Ricks LPN  Subject: Virtual Visit                                    Good afternoon! I just wanted to let you know that I noticed that Dr. Beth Eldridge has requested a virtual visit with Ms. Gurjit Kelly. When I spoke to her this afternoon, I shared the information that Dr. Beth Eldridge would like to schedule with her and encouraged her to call the office. She stated that she would prefer to have someone call her. She is home this afternoon and I reached her at [phone]. She also reported that she has been having \"migraines\" for the past day or so. Please let me know if you have any questions.      Thank you,  QUIN Batres, ACSW, [facility]    [facility] Management Team  [phone]

## 2020-05-28 NOTE — TELEPHONE ENCOUNTER
[phone] spoke to patient and per patient she is doing well her medication is doing well she just saw Dr. Delgado Grate 4/20 for virtual visit she doesn't need to see him she is doing well.  I advised patient if she needs anything to call us back

## 2020-08-25 DIAGNOSIS — E03.9 ACQUIRED HYPOTHYROIDISM: ICD-10-CM

## 2020-08-27 RX ORDER — LEVOTHYROXINE SODIUM 137 UG/1
TABLET ORAL
Qty: 30 TAB | Refills: 0 | Status: SHIPPED | OUTPATIENT
Start: 2020-08-27 | End: 2021-02-08 | Stop reason: SDUPTHER

## 2020-08-31 DIAGNOSIS — I10 ESSENTIAL HYPERTENSION: ICD-10-CM

## 2020-09-02 RX ORDER — LISINOPRIL 20 MG/1
TABLET ORAL
Qty: 90 TAB | Refills: 0 | Status: SHIPPED | OUTPATIENT
Start: 2020-09-02 | End: 2020-12-07

## 2020-11-22 DIAGNOSIS — E78.5 HYPERLIPIDEMIA, UNSPECIFIED HYPERLIPIDEMIA TYPE: ICD-10-CM

## 2020-11-25 RX ORDER — ATORVASTATIN CALCIUM 80 MG/1
TABLET, FILM COATED ORAL
Qty: 30 TAB | Refills: 0 | Status: SHIPPED | OUTPATIENT
Start: 2020-11-25 | End: 2021-02-08 | Stop reason: SDUPTHER

## 2020-12-06 DIAGNOSIS — I10 ESSENTIAL HYPERTENSION: ICD-10-CM

## 2020-12-06 DIAGNOSIS — E78.5 HYPERLIPIDEMIA, UNSPECIFIED HYPERLIPIDEMIA TYPE: ICD-10-CM

## 2020-12-06 DIAGNOSIS — Z72.0 TOBACCO ABUSE: ICD-10-CM

## 2020-12-06 NOTE — LETTER
12/7/2020 2:40 PM 
 
Ms. Lea León [address] [facility] [id number] Dear Ms. Hubbard Fabio missed you! Please call our office at [phone] and schedule a follow up appointment for your continued care. Please schedule a Virtual visit or in clinic visit for follow up and med refills. Sincerely, Roe Ghotra MD

## 2020-12-09 RX ORDER — LISINOPRIL 20 MG/1
TABLET ORAL
Qty: 30 TAB | Refills: 0 | Status: SHIPPED | OUTPATIENT
Start: 2020-12-09 | End: 2021-02-08 | Stop reason: SDUPTHER

## 2020-12-09 RX ORDER — VARENICLINE TARTRATE 1 MG/1
TABLET, FILM COATED ORAL
Qty: 60 TAB | Refills: 0 | Status: SHIPPED | OUTPATIENT
Start: 2020-12-09 | End: 2021-02-08 | Stop reason: SDUPTHER

## 2020-12-09 RX ORDER — EZETIMIBE 10 MG/1
TABLET ORAL
Qty: 30 TAB | Refills: 0 | Status: SHIPPED | OUTPATIENT
Start: 2020-12-09 | End: 2021-02-08 | Stop reason: SDUPTHER

## 2021-02-08 ENCOUNTER — VIRTUAL VISIT (OUTPATIENT)
Dept: FAMILY MEDICINE CLINIC | Age: 61
End: 2021-02-08
Payer: MEDICARE

## 2021-02-08 DIAGNOSIS — G89.29 CHRONIC LEFT SHOULDER PAIN: ICD-10-CM

## 2021-02-08 DIAGNOSIS — E55.9 VITAMIN D DEFICIENCY: ICD-10-CM

## 2021-02-08 DIAGNOSIS — Z12.11 COLON CANCER SCREENING: ICD-10-CM

## 2021-02-08 DIAGNOSIS — Z72.0 TOBACCO ABUSE: ICD-10-CM

## 2021-02-08 DIAGNOSIS — M25.512 CHRONIC LEFT SHOULDER PAIN: ICD-10-CM

## 2021-02-08 DIAGNOSIS — I10 ESSENTIAL HYPERTENSION: Primary | ICD-10-CM

## 2021-02-08 DIAGNOSIS — Z12.31 ENCOUNTER FOR SCREENING MAMMOGRAM FOR MALIGNANT NEOPLASM OF BREAST: ICD-10-CM

## 2021-02-08 DIAGNOSIS — F33.1 MAJOR DEPRESSIVE DISORDER, RECURRENT EPISODE, MODERATE (HCC): ICD-10-CM

## 2021-02-08 DIAGNOSIS — G89.29 CHRONIC BILATERAL LOW BACK PAIN WITHOUT SCIATICA: ICD-10-CM

## 2021-02-08 DIAGNOSIS — E78.5 HYPERLIPIDEMIA, UNSPECIFIED HYPERLIPIDEMIA TYPE: ICD-10-CM

## 2021-02-08 DIAGNOSIS — M54.50 CHRONIC BILATERAL LOW BACK PAIN WITHOUT SCIATICA: ICD-10-CM

## 2021-02-08 DIAGNOSIS — E03.9 ACQUIRED HYPOTHYROIDISM: ICD-10-CM

## 2021-02-08 DIAGNOSIS — F41.9 ANXIETY: ICD-10-CM

## 2021-02-08 PROCEDURE — G9899 SCRN MAM PERF RSLTS DOC: HCPCS | Performed by: FAMILY MEDICINE

## 2021-02-08 PROCEDURE — G9717 DOC PT DX DEP/BP F/U NT REQ: HCPCS | Performed by: FAMILY MEDICINE

## 2021-02-08 PROCEDURE — G8427 DOCREV CUR MEDS BY ELIG CLIN: HCPCS | Performed by: FAMILY MEDICINE

## 2021-02-08 PROCEDURE — G8419 CALC BMI OUT NRM PARAM NOF/U: HCPCS | Performed by: FAMILY MEDICINE

## 2021-02-08 PROCEDURE — 99214 OFFICE O/P EST MOD 30 MIN: CPT | Performed by: FAMILY MEDICINE

## 2021-02-08 PROCEDURE — 3017F COLORECTAL CA SCREEN DOC REV: CPT | Performed by: FAMILY MEDICINE

## 2021-02-08 PROCEDURE — G8756 NO BP MEASURE DOC: HCPCS | Performed by: FAMILY MEDICINE

## 2021-02-08 RX ORDER — DULOXETIN HYDROCHLORIDE 30 MG/1
30 CAPSULE, DELAYED RELEASE ORAL DAILY
Qty: 30 CAP | Refills: 5 | Status: SHIPPED | OUTPATIENT
Start: 2021-02-08 | End: 2021-11-08 | Stop reason: SDUPTHER

## 2021-02-08 RX ORDER — LEVOTHYROXINE SODIUM 137 UG/1
TABLET ORAL
Qty: 30 TAB | Refills: 0 | Status: SHIPPED | OUTPATIENT
Start: 2021-02-08 | End: 2021-03-23 | Stop reason: SDUPTHER

## 2021-02-08 RX ORDER — EZETIMIBE 10 MG/1
TABLET ORAL
Qty: 30 TAB | Refills: 0 | Status: SHIPPED | OUTPATIENT
Start: 2021-02-08 | End: 2021-03-23 | Stop reason: SDUPTHER

## 2021-02-08 RX ORDER — MELATONIN
1000 DAILY
Qty: 90 TAB | Refills: 1 | Status: SHIPPED | OUTPATIENT
Start: 2021-02-08

## 2021-02-08 RX ORDER — ATORVASTATIN CALCIUM 80 MG/1
TABLET, FILM COATED ORAL
Qty: 30 TAB | Refills: 0 | Status: SHIPPED | OUTPATIENT
Start: 2021-02-08 | End: 2021-03-23 | Stop reason: SDUPTHER

## 2021-02-08 RX ORDER — LISINOPRIL 20 MG/1
TABLET ORAL
Qty: 30 TAB | Refills: 0 | Status: SHIPPED | OUTPATIENT
Start: 2021-02-08 | End: 2021-03-23 | Stop reason: SDUPTHER

## 2021-02-08 RX ORDER — VARENICLINE TARTRATE 1 MG/1
TABLET, FILM COATED ORAL
Qty: 60 TAB | Refills: 0 | Status: SHIPPED | OUTPATIENT
Start: 2021-02-08 | End: 2021-11-08 | Stop reason: SINTOL

## 2021-02-08 NOTE — PROGRESS NOTES
Princess Juarez is a 61 y.o. female who was seen by synchronous (real-time) audio-video technology on 2/8/2021. Consent:  Services were provided through a video synchronous discussion virtually to substitute for in-person appointment. She and/or her healthcare decision maker is aware that this patient-initiated Telehealth encounter is a billable service, with coverage as determined by her insurance carrier. She is aware that she may receive a bill and has provided verbal consent to proceed: Yes    I was in the office while conducting this encounter. Subjective:   Princess Juarez was seen for No chief complaint on file. Hypertension: Pt continues with lisinopril 20 mg/day. Hyperlipidemia: Lipid panel on 10/2/2019 notable for total cholesterol 196, HDL 70, , and triglycerides 106. Pt continues with Lipitor 10 mg/day and Zetia 10 mg/day. Hypothyroidism: Pt's TSH level was 7.770 on 10/2/2019. Pt continues with Synthroid 137 mcg/day. Vitamin D deficiency: Pt's vitamin D levels were 19.3 on 10/2/2019. Pt continues with cholecalciferol 1,000 units/day. Health maintenance: Pt is due for their colorectal cancer screening; I have placed orders for pt to receive a Cologuard test at their home. Pt states she recently got a new plate for her teeth. Pt informs me she has been tired and fatigued recently due to taking care of her mother. Pt continues to experience left shoulder pain that worsens with wet weather. Prior to Admission medications    Medication Sig Start Date End Date Taking?  Authorizing Provider   lisinopriL (PRINIVIL, ZESTRIL) 20 mg tablet TAKE 1 TABLET BY MOUTH ONCE DAILY **SCHEDULE OFFICE VISIT WITH YOUR DOCTOR FOR REFILLS. 2/8/21  Yes Rafiq Cai MD   ezetimibe (ZETIA) 10 mg tablet Take 1 tablet by mouth once daily 2/8/21  Yes Rafiq Cai MD   varenicline (Chantix) 1 mg tablet Take 1 tablet by mouth twice daily 2/8/21  Yes Rafiq Cai MD atorvastatin (LIPITOR) 80 mg tablet Take 1 tablet by mouth once daily 2/8/21  Yes Jannette Crandall MD   levothyroxine (SYNTHROID) 137 mcg tablet TAKE 1 TABLET BY MOUTH ONCE DAILY BEFORE BREAKFAST 2/8/21  Yes Jannette Crandall MD   DULoxetine (CYMBALTA) 30 mg capsule Take 1 Cap by mouth daily. 2/8/21  Yes Jannette Crandall MD   cholecalciferol (Vitamin D3) (1000 Units /25 mcg) tablet Take 1 Tab by mouth daily. 2/8/21  Yes Jannette Crandall MD   multivitamin (ONE A DAY) tablet Take 1 Tab by mouth daily. 4/20/20  Yes Jannette Crandall MD   lisinopriL (PRINIVIL, ZESTRIL) 20 mg tablet TAKE 1 TABLET BY MOUTH ONCE DAILY **SCHEDULE OFFICE VISIT WITH YOUR DOCTOR FOR REFILLS. 12/9/20 2/8/21  Jannette Crandall MD   ezetimibe (ZETIA) 10 mg tablet Take 1 tablet by mouth once daily 12/9/20 2/8/21  Jannette Crandall MD   Chantix 1 mg tablet Take 1 tablet by mouth twice daily 12/9/20 2/8/21  Jannette Crandall MD   atorvastatin (LIPITOR) 80 mg tablet Take 1 tablet by mouth once daily 11/25/20 2/8/21  Jannette Crandall MD   levothyroxine (SYNTHROID) 137 mcg tablet TAKE 1 TABLET BY MOUTH ONCE DAILY BEFORE BREAKFAST 8/27/20 2/8/21  Jude Foster MD   DULoxetine (CYMBALTA) 30 mg capsule Take 1 Cap by mouth daily. 4/20/20 2/8/21  Jannette Crandall MD   cholecalciferol (Vitamin D3) (1000 Units /25 mcg) tablet Take 1 Tab by mouth daily.  4/20/20 2/8/21  Jannette Crandall MD     Allergies   Allergen Reactions    Pcn [Penicillins] Hives, Shortness of Breath and Itching    Bactrim [Sulfamethoprim Ds] Unable to Obtain     Past Medical History:   Diagnosis Date    AC (acromioclavicular) joint bone spurs     Acid reflux     Anemia NEC     Arthritis     right arm     Cancer (HCC)     thyroid    Chronic pain     Depression     High cholesterol     Incontinence of urine     MDD (major depressive disorder) 2013    Multiple thyroid nodules     Other ill-defined conditions(799.89)     right shoulder pain    Sleep apnea 2013    Thyroid disease      Past Surgical History:   Procedure Laterality Date    HX BREAST BIOPSY Right 2009    US; neg    HX  SECTION      HX HERNIA REPAIR      HX HYSTERECTOMY       Family History   Problem Relation Age of Onset    Hypertension Mother    [facility] Arthritis-rheumatoid Mother     Cancer Father         colon    Diabetes Father    [facility] Cancer Sister         breast cancer    Diabetes Sister     Thyroid Disease Sister         thyroid cancer    Breast Cancer Sister 64    Hypertension Brother     Cancer Brother         colon    Cancer Sister         thyroid    Seizures Sister     Cancer Paternal Grandfather         prostate      Social History     Tobacco Use    Smoking status: Current Every Day Smoker     Years: 30.00     Last attempt to quit: 2013     Years since quittin.6    Smokeless tobacco: Never Used    Tobacco comment: 6-7 CIGS PER DAY   Substance Use Topics    Alcohol use: No        Review of Systems   Constitutional: Negative for chills and fever. HENT: Negative for hearing loss and tinnitus. Eyes: Negative for blurred vision and double vision. Respiratory: Negative for cough and shortness of breath. Cardiovascular: Negative for chest pain and palpitations. Gastrointestinal: Negative for nausea and vomiting. Genitourinary: Negative for dysuria and frequency. Musculoskeletal: Negative for back pain and falls. Skin: Negative for itching and rash. Neurological: Negative for dizziness, loss of consciousness and headaches. Endo/Heme/Allergies: Negative. Psychiatric/Behavioral: Negative for depression. The patient is not nervous/anxious. PHYSICAL EXAMINATION:  Vital Signs: (As obtained by patient/caregiver at home)  There were no vitals taken for this visit.      Constitutional: [x] Appears well-developed and well-nourished [x] No apparent distress      [] Abnormal -     Mental status: [x] Alert and awake  [x] Oriented to person/place/time [x] Able to follow commands    [] Abnormal -     Eyes:   EOM    [x]  Normal    [] Abnormal -   Sclera  [x]  Normal    [] Abnormal -          Discharge [x]  None visible   [] Abnormal -     HENT: [x] Normocephalic, atraumatic  [] Abnormal -   [x] Mouth/Throat: Mucous membranes are moist    External Ears [x] Normal  [] Abnormal -    Neck: [x] No visualized mass [] Abnormal -     Pulmonary/Chest: [x] Respiratory effort normal   [x] No visualized signs of difficulty breathing or respiratory distress        [] Abnormal -      Musculoskeletal:   [x] Normal gait with no signs of ataxia         [x] Normal range of motion of neck        [] Abnormal -     Neurological:        [x] No Facial Asymmetry (Cranial nerve 7 motor function) (limited exam due to video visit)          [x] No gaze palsy        [] Abnormal -          Skin:        [x] No significant exanthematous lesions or discoloration noted on facial skin         [] Abnormal -            Psychiatric:       [x] Normal Affect [] Abnormal -        [x] No Hallucinations    Other pertinent observable physical exam findings:-    Assessment & Plan:   Diagnoses and all orders for this visit:    1. Essential hypertension  Pt continues with lisinopril 20 mg/day. Labwork ordered and will be completed when pt schedules appointment to come into clinic. Advised pt to follow proper safety precautions against COVID-19 when coming to clinic.  -     METABOLIC PANEL, COMPREHENSIVE; Future  -     LIPID PANEL; Future  -     CBC WITH AUTOMATED DIFF; Future  -     lisinopriL (PRINIVIL, ZESTRIL) 20 mg tablet; TAKE 1 TABLET BY MOUTH ONCE DAILY **SCHEDULE OFFICE VISIT WITH YOUR DOCTOR FOR REFILLS. -     METABOLIC PANEL, COMPREHENSIVE; Future    2. Major depressive disorder, recurrent episode, moderate (HCC)  Stable. Pt has no complaints dring today's OV. She continues the Cymbalta 30 mg/day. Pt requests a refill of their medication, which I have granted.  The Prescription Monitoring Program has been reviewed for recent activity regarding controlled substances for this patient. Assessment & Plan:  Stable, based on history, physical exam and review of pertinent labs, studies and medications; meds reconciled; continue current treatment plan. Orders:  -     DULoxetine (CYMBALTA) 30 mg capsule; Take 1 Cap by mouth daily. 3. Hyperlipidemia, unspecified hyperlipidemia type  Lipid panel on 10/2/2019 notable for total cholesterol 196, HDL 70, , and triglycerides 106. Pt continues with Lipitor 10 mg/day and Zetia 10 mg/day. Pt requests a refill of their medication, which I have granted. -     LIPID PANEL; Future  -     ezetimibe (ZETIA) 10 mg tablet; Take 1 tablet by mouth once daily  -     atorvastatin (LIPITOR) 80 mg tablet; Take 1 tablet by mouth once daily  -     METABOLIC PANEL, COMPREHENSIVE; Future  -     LIPID PANEL; Future    4. Tobacco abuse  Pt requests a refill of their medication, which I have granted. -     varenicline (Chantix) 1 mg tablet; Take 1 tablet by mouth twice daily    5. Acquired hypothyroidism  Pt's TSH level was 7.770 on 10/2/2019. Pt continues with Synthroid 137 mcg/day. Pt requests a refill of their medication, which I have granted. -     levothyroxine (SYNTHROID) 137 mcg tablet; TAKE 1 TABLET BY MOUTH ONCE DAILY BEFORE BREAKFAST  -     TSH 3RD GENERATION; Future  -     T4, FREE; Future    6. Chronic left shoulder pain  Pt continues to experience left shoulder pain that worsens with wet weather. 7. Encounter for screening mammogram for malignant neoplasm of breast  I have placed orders for pt to have an updated mammogram performed. -     [facility] MAMMO BI SCREENING INCL CAD; Future    8. Colon cancer screening   Pt is due for their colorectal cancer screening; I have placed orders for pt to receive a Cologuard test at their home.   -     COLOGUARD TEST (FECAL DNA COLORECTAL CANCER SCREENING)    9.  Chronic bilateral low back pain without sciatica  Pt requests a refill of their medication, which I have granted. -     DULoxetine (CYMBALTA) 30 mg capsule; Take 1 Cap by mouth daily. 10. Anxiety  Pt requests a refill of their medication, which I have granted. -     DULoxetine (CYMBALTA) 30 mg capsule; Take 1 Cap by mouth daily. 11. Vitamin D deficiency  Pt's vitamin D levels were 19.3 on 10/2/2019. Pt continues with cholecalciferol 1,000 units/day. -     cholecalciferol (Vitamin D3) (1000 Units /25 mcg) tablet; Take 1 Tab by mouth daily. We discussed the expected course, resolution and complications of the diagnosis(es) in detail. Medication risks, benefits, costs, interactions, and alternatives were discussed as indicated. I advised her to contact the office if her condition worsens, changes or fails to improve as anticipated. She expressed understanding with the diagnosis(es) and plan. Pursuant to the emergency declaration under the [address] [place] [address] waiver authority and the [employer] and [employer] General Act, this Virtual Visit was conducted, with patient's consent, to reduce the patient's risk of exposure to COVID-19 and provide continuity of care for an established patient. Services were provided through a video synchronous discussion virtually to substitute for in-person clinic visit. Written by hansel Sherman, as dictated by David Nieves M.D.    4:37 PM - 4:53 PM    Total time spent with the patient 16 minutes, greater than 50% of time spent counseling patient.

## 2021-02-11 ENCOUNTER — TELEPHONE (OUTPATIENT)
Dept: FAMILY MEDICINE CLINIC | Age: 61
End: 2021-02-11

## 2021-02-11 NOTE — TELEPHONE ENCOUNTER
Patient advised labs have been ordered.   Advised to go to [facility]   Patient advises she will go when she can.

## 2021-02-11 NOTE — TELEPHONE ENCOUNTER
----- Message from Satinder Corey MD sent at 2/8/2021  4:44 PM EST -----  Regarding: lab appt  Pt needs a lab appt

## 2021-03-22 ENCOUNTER — TELEPHONE (OUTPATIENT)
Dept: FAMILY MEDICINE CLINIC | Age: 61
End: 2021-03-22

## 2021-03-22 NOTE — TELEPHONE ENCOUNTER
Patient is being offered vaccine at [facility] advised yes she should get the vaccine .

## 2021-03-22 NOTE — TELEPHONE ENCOUNTER
----- Message from April M Jorge Schilder sent at 3/22/2021 11:06 AM EDT -----  Regarding: Conor Drake MD/TELEPHONE  General Message/Vendor Calls    Caller's first and last name:      Reason for call: Requested a call back       Callback required yes/no and why: Yes       Best contact number(s): [phone]      Details to clarify the request: Patient stated that her county is offering the covid vaccine and she nerves about getting the vaccine because she allgeric to penicillin she she doesn't know what in the medication. She would like advice on what she should do if she just needs to come there to the office to get her vaccine of if she could have it done with the county with vaccine should she take because she \take care of her mother and doesn't want to get sick taking the vaccine and unable to care for her mother.        April [address]

## 2021-03-23 DIAGNOSIS — E78.5 HYPERLIPIDEMIA, UNSPECIFIED HYPERLIPIDEMIA TYPE: ICD-10-CM

## 2021-03-23 DIAGNOSIS — E03.9 ACQUIRED HYPOTHYROIDISM: ICD-10-CM

## 2021-03-23 DIAGNOSIS — I10 ESSENTIAL HYPERTENSION: ICD-10-CM

## 2021-03-23 RX ORDER — LEVOTHYROXINE SODIUM 137 UG/1
137 TABLET ORAL
Qty: 30 TAB | Refills: 0 | Status: SHIPPED | OUTPATIENT
Start: 2021-03-23 | End: 2021-03-26

## 2021-03-23 RX ORDER — EZETIMIBE 10 MG/1
10 TABLET ORAL DAILY
Qty: 30 TAB | Refills: 0 | Status: SHIPPED | OUTPATIENT
Start: 2021-03-23 | End: 2021-07-28

## 2021-03-23 RX ORDER — LISINOPRIL 20 MG/1
20 TABLET ORAL DAILY
Qty: 30 TAB | Refills: 0 | Status: SHIPPED | OUTPATIENT
Start: 2021-03-23 | End: 2021-07-28

## 2021-03-23 RX ORDER — ATORVASTATIN CALCIUM 80 MG/1
80 TABLET, FILM COATED ORAL DAILY
Qty: 30 TAB | Refills: 0 | Status: SHIPPED | OUTPATIENT
Start: 2021-03-23 | End: 2021-04-23

## 2021-03-23 NOTE — TELEPHONE ENCOUNTER
Luisito Alaniz MD, patient out of refills and prefers 90-day supply if appropriate. Rx pended for your signature/modification as appropriate    LOV: 2/8/21  Next: to be scheduled - reminded patient today (expresses much stress d/t caring full-time for her mother)    Thank you,  [facility] [place], [place], [address]  Direct: [phone]  Department, toll free: [phone], option 7     =========================================================  CLINICAL PHARMACY: ADHERENCE REVIEW  Identified care gap per [facility]; fills at [facility]: ACE/ARB and Statin adherence    Last Visit: 2/8/21 virtual visit    Patient identified as LIS, therefore patient may be able to receive a 90-day supply for cost savings    Patient not found in Outcomes MTM    ASSESSMENT  ACE/ARB ADHERENCE    Per Insurance Records through 3/15/21 (2020 [place] = 79%; YTD [place] = 79%):   Lisinopril 20mg daily last filled on 2/8/21 for 30 day supply. Next refill due: 3/9/21    Per chart, ordered at 2/8/21 virtual visit with note: Labwork ordered and will be completed when pt schedules appointment to come into clinic. BP Readings from Last 3 Encounters:   04/20/20 134/84   10/28/19 137/89   10/02/19 137/89     CrCl cannot be calculated (Patient's most recent lab result is older than the maximum 180 days allowed. ). [facility]    Per Insurance Records through 3/15/21 (2020 [place] = 84%; YTD PDC = 79%): Atorvastatin 80mg daily last filled on 2/8/21 for 30 day supply. Next refill due: 3/9/21    Per chart, ordered at 2/8/21 virtual visit with note: Labwork ordered and will be completed when pt schedules appointment to come into clinic.     Lab Results   Component Value Date/Time    Cholesterol, total 196 10/02/2019 11:32 AM    HDL Cholesterol 70 10/02/2019 11:32 AM    LDL, calculated 105 (H) 10/02/2019 11:32 AM    VLDL, calculated 21 10/02/2019 11:32 AM    Triglyceride 106 10/02/2019 11:32 AM    CHOL/HDL Ratio 4.0 03/01/2010 09:51 AM     ALT (SGPT)   Date Value Ref Range Status   10/02/2019 28 0 - 32 IU/L Final     AST (SGOT)   Date Value Ref Range Status   10/02/2019 26 0 - 40 IU/L Final     The ASCVD Risk score (Emiliano Venegas., et al., 2013) failed to calculate for the following reasons:    ASCVD risk score not calculated     PLAN  The following are interventions that have been identified:  - Patient overdue refilling lisinopril and atorvastatin (#30 LF 2/8; 2020 PDCs @79-84%)  - Patient eligible for 90 day supply of lisinopril and atorvastatin ([facility]  · Appears at 2/8/21 virtual visit, duloxetine and cholecalciferol were ordered for @6 months; atorvastatin, ezetimibe, levothyroxine, lisinopril  - Appears due for in office visit and labwork    Reached patient to review. Expresses stress in caring full-time for her mother and is rarely able to leave or take care of herself. Admits to missing or forgetting medication doses at times, from exhaustion or remembering late. Discussed missed dose and reminder strategy; does use pill box. Knows she is due for appointment and labwork, and is trying to get a nurse to help care for her mother at times, or someone to stay with her mother so she can schedule. Is very agreeable to 90-day supplies to help save her trips to the pharmacy. Will pend atorvastatin, ezetimibe, levothyroxine, lisinopril for 90-ds to PCP. No future appointments.

## 2021-03-23 NOTE — LETTER
3/24/2021 12:11 PM 
 
Ms. Guanaco Del Rosario [address] [facility] [address] Dear Ms. Nasrin Brunner missed you! Please call our office at [phone] and schedule a follow up appointment for your continued care. Past due for labs Sincerely, 
 
 
Dr. Jose F Valenzuela

## 2021-03-25 NOTE — TELEPHONE ENCOUNTER
Noted lisinopril, atorvastatin, ezetimibe and levothyroxine reordered for #30, 0 refills with letter to patient advising lab work and follow-up needed (and as also reviewed with patient by phone 3/23).     No further outreach at this time.    =========================================================   For Pharmacy Admin Tracking Only    PHSO: PHSO Patient?: Yes  Total # of Interventions Recommended: Count: 4  - Refills Provided #: 4  Recommended intervention potential cost savings: 1  Total Interventions Accepted: 4  Time Spent (min): 15

## 2021-03-26 DIAGNOSIS — E03.9 ACQUIRED HYPOTHYROIDISM: ICD-10-CM

## 2021-03-26 RX ORDER — LEVOTHYROXINE SODIUM 137 UG/1
TABLET ORAL
Qty: 30 TAB | Refills: 0 | Status: SHIPPED | OUTPATIENT
Start: 2021-03-26 | End: 2021-06-11

## 2021-04-23 DIAGNOSIS — E78.5 HYPERLIPIDEMIA, UNSPECIFIED HYPERLIPIDEMIA TYPE: ICD-10-CM

## 2021-04-23 RX ORDER — ATORVASTATIN CALCIUM 80 MG/1
TABLET, FILM COATED ORAL
Qty: 30 TAB | Refills: 0 | Status: SHIPPED | OUTPATIENT
Start: 2021-04-23 | End: 2021-07-28

## 2021-06-09 DIAGNOSIS — E03.9 ACQUIRED HYPOTHYROIDISM: ICD-10-CM

## 2021-06-11 RX ORDER — LEVOTHYROXINE SODIUM 137 UG/1
TABLET ORAL
Qty: 30 TABLET | Refills: 0 | Status: SHIPPED | OUTPATIENT
Start: 2021-06-11 | End: 2021-07-28

## 2021-06-30 NOTE — TELEPHONE ENCOUNTER
Patient states she would like a call back in regards to taking her MRI at [facility] tomorrow at 10:45,& if she should take the volume  gave her before the MRI. She also wants to ask about a referral to a doctor that will help her with her issues right now. She was given medication to remain calm but she thinks she needs to talk to someone.    Best call back # [phone] 1.95

## 2021-07-27 DIAGNOSIS — E78.5 HYPERLIPIDEMIA, UNSPECIFIED HYPERLIPIDEMIA TYPE: ICD-10-CM

## 2021-07-27 DIAGNOSIS — I10 ESSENTIAL HYPERTENSION: ICD-10-CM

## 2021-07-27 DIAGNOSIS — E03.9 ACQUIRED HYPOTHYROIDISM: ICD-10-CM

## 2021-07-28 RX ORDER — LISINOPRIL 20 MG/1
TABLET ORAL
Qty: 30 TABLET | Refills: 0 | Status: SHIPPED | OUTPATIENT
Start: 2021-07-28 | End: 2021-11-08 | Stop reason: SDUPTHER

## 2021-07-28 RX ORDER — EZETIMIBE 10 MG/1
TABLET ORAL
Qty: 30 TABLET | Refills: 0 | Status: SHIPPED | OUTPATIENT
Start: 2021-07-28 | End: 2021-11-08 | Stop reason: SDUPTHER

## 2021-07-28 RX ORDER — LEVOTHYROXINE SODIUM 137 UG/1
TABLET ORAL
Qty: 30 TABLET | Refills: 0 | Status: SHIPPED | OUTPATIENT
Start: 2021-07-28 | End: 2021-10-14 | Stop reason: SDUPTHER

## 2021-07-28 RX ORDER — ATORVASTATIN CALCIUM 80 MG/1
TABLET, FILM COATED ORAL
Qty: 30 TABLET | Refills: 0 | Status: SHIPPED | OUTPATIENT
Start: 2021-07-28 | End: 2021-11-08 | Stop reason: SDUPTHER

## 2021-08-09 ENCOUNTER — TELEPHONE (OUTPATIENT)
Dept: FAMILY MEDICINE CLINIC | Age: 61
End: 2021-08-09

## 2021-08-09 NOTE — TELEPHONE ENCOUNTER
----- Message from [employer] sent at 8/9/2021 11:18 AM EDT -----  Regarding: Dr Foster/telephone  Contact: [phone]  General Message/Vendor Calls    Caller's first and last name:wendi david      Reason for call:discuss replacing the Chantex medication. Pt states it makes her sick.       Callback required yes/no and why:yes      Best contact number(s):[phone]      Details to clarify the request:      [employer]

## 2021-08-10 NOTE — TELEPHONE ENCOUNTER
There was a recall on Chantix per Patient but her's was not in the re-call     Advises when she takes it it makes her have Nausea     Can we switch her to something else     Also she is requesting a letter to have a support dog in her home due to her depression     Patient advised she will need to inquire about the requirements for having a support dog.

## 2021-10-14 DIAGNOSIS — E03.9 ACQUIRED HYPOTHYROIDISM: ICD-10-CM

## 2021-10-14 NOTE — LETTER
10/19/2021 9:54 AM    Ms. Kristen Culver  [address] [place] [address]        Ms. Emi Horta is currently under my care for a Life -Limiting disability. I support her use of a service dog for her disability.        Sincerely,      Henrietta Jones MD

## 2021-10-14 NOTE — TELEPHONE ENCOUNTER
lov 02.08.2021    Never scheduled her follow up appointment   No upcoming appointment   Needs appointment none available

## 2021-10-15 RX ORDER — LEVOTHYROXINE SODIUM 137 UG/1
137 TABLET ORAL
Qty: 30 TABLET | Refills: 5 | Status: SHIPPED | OUTPATIENT
Start: 2021-10-15 | End: 2021-11-08 | Stop reason: SDUPTHER

## 2021-10-18 NOTE — TELEPHONE ENCOUNTER
Pt called and said she has a puppy who helps her with her anxiety. Requesting letter from  stating that he is her support dog so she can have him registered.      BCB# [phone]

## 2021-10-19 NOTE — TELEPHONE ENCOUNTER
Per DR. Foster yes we can provide Patient with letter for support animal.   Letter per DR. Foster verbal order.

## 2021-11-08 ENCOUNTER — VIRTUAL VISIT (OUTPATIENT)
Dept: FAMILY MEDICINE CLINIC | Age: 61
End: 2021-11-08
Payer: MEDICARE

## 2021-11-08 DIAGNOSIS — E78.5 HYPERLIPIDEMIA, UNSPECIFIED HYPERLIPIDEMIA TYPE: ICD-10-CM

## 2021-11-08 DIAGNOSIS — E03.9 ACQUIRED HYPOTHYROIDISM: ICD-10-CM

## 2021-11-08 DIAGNOSIS — Z00.00 ENCOUNTER FOR MEDICARE ANNUAL WELLNESS EXAM: ICD-10-CM

## 2021-11-08 DIAGNOSIS — Z12.31 VISIT FOR SCREENING MAMMOGRAM: ICD-10-CM

## 2021-11-08 DIAGNOSIS — F41.9 ANXIETY: Primary | ICD-10-CM

## 2021-11-08 DIAGNOSIS — I10 ESSENTIAL HYPERTENSION: ICD-10-CM

## 2021-11-08 DIAGNOSIS — F33.1 MAJOR DEPRESSIVE DISORDER, RECURRENT EPISODE, MODERATE (HCC): ICD-10-CM

## 2021-11-08 DIAGNOSIS — Z72.0 TOBACCO ABUSE: ICD-10-CM

## 2021-11-08 PROCEDURE — G9899 SCRN MAM PERF RSLTS DOC: HCPCS | Performed by: FAMILY MEDICINE

## 2021-11-08 PROCEDURE — G8756 NO BP MEASURE DOC: HCPCS | Performed by: FAMILY MEDICINE

## 2021-11-08 PROCEDURE — G8421 BMI NOT CALCULATED: HCPCS | Performed by: FAMILY MEDICINE

## 2021-11-08 PROCEDURE — G9717 DOC PT DX DEP/BP F/U NT REQ: HCPCS | Performed by: FAMILY MEDICINE

## 2021-11-08 PROCEDURE — G8427 DOCREV CUR MEDS BY ELIG CLIN: HCPCS | Performed by: FAMILY MEDICINE

## 2021-11-08 PROCEDURE — G0439 PPPS, SUBSEQ VISIT: HCPCS | Performed by: FAMILY MEDICINE

## 2021-11-08 PROCEDURE — 3017F COLORECTAL CA SCREEN DOC REV: CPT | Performed by: FAMILY MEDICINE

## 2021-11-08 PROCEDURE — 99214 OFFICE O/P EST MOD 30 MIN: CPT | Performed by: FAMILY MEDICINE

## 2021-11-08 RX ORDER — EZETIMIBE 10 MG/1
10 TABLET ORAL DAILY
Qty: 30 TABLET | Refills: 3 | Status: SHIPPED | OUTPATIENT
Start: 2021-11-08 | End: 2022-02-19

## 2021-11-08 RX ORDER — IBUPROFEN 200 MG
1 TABLET ORAL EVERY 24 HOURS
Qty: 30 PATCH | Refills: 0 | Status: SHIPPED | OUTPATIENT
Start: 2021-11-08 | End: 2021-12-08

## 2021-11-08 RX ORDER — LEVOTHYROXINE SODIUM 137 UG/1
137 TABLET ORAL
Qty: 30 TABLET | Refills: 3 | Status: SHIPPED | OUTPATIENT
Start: 2021-11-08 | End: 2022-02-19

## 2021-11-08 RX ORDER — NICOTINE 7MG/24HR
1 PATCH, TRANSDERMAL 24 HOURS TRANSDERMAL EVERY 24 HOURS
Qty: 30 PATCH | Refills: 0 | Status: SHIPPED | OUTPATIENT
Start: 2022-01-06 | End: 2022-02-05

## 2021-11-08 RX ORDER — ATORVASTATIN CALCIUM 80 MG/1
80 TABLET, FILM COATED ORAL DAILY
Qty: 30 TABLET | Refills: 3 | Status: SHIPPED | OUTPATIENT
Start: 2021-11-08 | End: 2022-02-19

## 2021-11-08 RX ORDER — IBUPROFEN 200 MG
1 TABLET ORAL EVERY 24 HOURS
Qty: 30 PATCH | Refills: 0 | Status: SHIPPED | OUTPATIENT
Start: 2021-12-07 | End: 2022-01-06

## 2021-11-08 RX ORDER — LISINOPRIL 20 MG/1
20 TABLET ORAL DAILY
Qty: 30 TABLET | Refills: 3 | Status: SHIPPED | OUTPATIENT
Start: 2021-11-08 | End: 2022-02-19

## 2021-11-08 RX ORDER — DULOXETIN HYDROCHLORIDE 30 MG/1
30 CAPSULE, DELAYED RELEASE ORAL DAILY
Qty: 30 CAPSULE | Refills: 3 | Status: SHIPPED | OUTPATIENT
Start: 2021-11-08

## 2021-11-08 NOTE — PROGRESS NOTES
Ela Herndon is a 64 y.o. female who was seen by synchronous (real-time) audio-video technology on 11/8/2021. Consent:  Services were provided through a video synchronous discussion virtually to substitute for in-person appointment. She and/or her healthcare decision maker is aware that this patient-initiated Telehealth encounter is a billable service, with coverage as determined by her insurance carrier. She is aware that she may receive a bill and has provided verbal consent to proceed: Yes    I was in the office while conducting this encounter. Subjective:   Ela Herndon was seen for No chief complaint on file. Hypertension: Pt continues with Lisinopril 20mg take 1 tablet orally daily. Pt requests a refill of their medication, which I have granted. Anxiety:  She reports that she has been stressed out because of her mom and taking care of her. She states that is physically, emotionally, and mentally tired and that she is overwhelmed. She states that she cannot get sleep at night and she has a support dog to help alleviate her stress and anxiety. I have referred pt to case management. Chronic Bilateral Lower Back Pain w/o Sciatica: Pt is currently on Cymbalta 30mg take 1 capsule orally daily for chronic bilateral lower back pain w/o sciatica. Hypothyroidism: Pt's TSH level was 7.77 on 10/02/19. Pt continues with Lexothyroxine 137mcg take 1 tablet orally daily. Pt requests a refill of their medication, which I have granted. Hyperlipidemia: Lipid panel on 10/02/19 notable for total cholesterol 196, HDL 70, , and triglycerides 106. Pt continues with Lipitor 80mg take 1 tablet orally daily and Zetia 10mg take 1 tablet orally daily. Pt requests a refill of their medication, which I have granted. Pt will have updated lab work performed in the future. Smoking: Pt reports that she cannot take her Chantix anymore. She states that when she was taking it, it would make her sick. Pt reports that she smokes a 1/2 a pack in 4-5 days. She is requesting for an alternative to Chantix. I have rx'd the pt Nicoderm 21mg/24hr, 14mg/24hr, and 7mg/24hr 1 patch transdermal route every 24 hours for 30 days. Health Maintenance:  Pt is due for an updated mammogram; orders have been placed for this imaging to be performed. Medicare questionnaire discussed and responses reviewed today in office. Pt reports that she drinks wine occasionally and not on a regualar basis. Prior to Admission medications    Medication Sig Start Date End Date Taking? Authorizing Provider   levothyroxine (Euthyrox) 137 mcg tablet Take 1 Tablet by mouth Daily (before breakfast). 11/8/21  Yes Panda Metz MD   ezetimibe (ZETIA) 10 mg tablet Take 1 Tablet by mouth daily. 11/8/21  Yes Panda Metz MD   atorvastatin (LIPITOR) 80 mg tablet Take 1 Tablet by mouth daily. 11/8/21  Yes Panda Metz MD   lisinopriL (PRINIVIL, ZESTRIL) 20 mg tablet Take 1 Tablet by mouth daily. 11/8/21  Yes Panda Metz MD   DULoxetine (CYMBALTA) 30 mg capsule Take 1 Capsule by mouth daily. 11/8/21  Yes Panda Metz MD   nicotine (NICODERM CQ) 21 mg/24 hr 1 Patch by TransDERmal route every twenty-four (24) hours for 30 days. 11/8/21 12/8/21 Yes Panda Metz MD   nicotine (NICODERM CQ) 14 mg/24 hr patch 1 Patch by TransDERmal route every twenty-four (24) hours for 30 days. 12/7/21 1/6/22 Yes Panda Metz MD   nicotine (NICODERM CQ) 7 mg/24 hr 1 Patch by TransDERmal route every twenty-four (24) hours for 30 days. 1/6/22 2/5/22 Yes Nery Foster MD   cholecalciferol (Vitamin D3) (1000 Units /25 mcg) tablet Take 1 Tab by mouth daily. 2/8/21  Yes Panda Metz MD   multivitamin (ONE A DAY) tablet Take 1 Tab by mouth daily. 4/20/20  Yes Panda Metz MD   levothyroxine (Euthyrox) 137 mcg tablet Take 1 Tablet by mouth Daily (before breakfast).  10/15/21 11/8/21  Nery Foster MD   atorvastatin (LIPITOR) 80 mg tablet Take 1 tablet by mouth once daily 21  Omar Carrizales MD   ezetimibe (ZETIA) 10 mg tablet Take 1 tablet by mouth once daily 21  Hannah Foster MD   lisinopriL (PRINIVIL, ZESTRIL) 20 mg tablet Take 1 tablet by mouth once daily 21  Omar Carrizales MD   varenicline (Chantix) 1 mg tablet Take 1 tablet by mouth twice daily 21  Omar Carrizales MD   DULoxetine (CYMBALTA) 30 mg capsule Take 1 Cap by mouth daily.  21  Omar Carrizales MD     Allergies   Allergen Reactions    Pcn [Penicillins] Hives, Shortness of Breath and Itching    Bactrim [Sulfamethoprim Ds] Unable to Obtain     Past Medical History:   Diagnosis Date    AC (acromioclavicular) joint bone spurs     Acid reflux     Anemia NEC     Arthritis     right arm     Cancer ([facility] [address]     thyroid    Chronic pain     Depression     High cholesterol     Incontinence of urine     MDD (major depressive disorder)     Multiple thyroid nodules     Other ill-defined conditions(799.89)     right shoulder pain    Sleep apnea 2013    Thyroid disease      Past Surgical History:   Procedure Laterality Date    HX BREAST BIOPSY Right 2009    US; neg    HX  SECTION      HX HERNIA REPAIR      HX HYSTERECTOMY       Family History   Problem Relation Age of Onset    Hypertension Mother    [employer] Arthritis-rheumatoid Mother     Cancer Father         colon    Diabetes Father     Cancer Sister         breast cancer    Diabetes Sister     Thyroid Disease Sister         thyroid cancer    Breast Cancer Sister 64    Hypertension Brother     Cancer Brother         colon    Cancer Sister         thyroid    Seizures Sister     Cancer Paternal Grandfather         prostate      Social History     Tobacco Use    Smoking status: Current Every Day Smoker     Years: 30.00     Last attempt to quit: 2013     Years since quittin.4    Smokeless tobacco: Never Used    Tobacco comment: 6-7 CIGS PER DAY   Substance Use Topics    Alcohol use: No        Review of Systems   Constitutional: Negative for chills and fever. HENT: Negative for hearing loss and tinnitus. Eyes: Negative for blurred vision and double vision. Respiratory: Negative for cough and shortness of breath. Cardiovascular: Negative for chest pain and palpitations. Gastrointestinal: Negative for nausea and vomiting. Genitourinary: Negative for dysuria and frequency. Musculoskeletal: Negative for back pain and falls. Skin: Negative for itching and rash. Neurological: Negative for dizziness, loss of consciousness and headaches. Endo/Heme/Allergies: Negative. Psychiatric/Behavioral: Negative for depression. The patient is nervous/anxious. PHYSICAL EXAMINATION:  Vital Signs: (As obtained by patient/caregiver at home)  There were no vitals taken for this visit.      Constitutional: [x] Appears well-developed and well-nourished [x] No apparent distress      [] Abnormal -     Mental status: [x] Alert and awake  [x] Oriented to person/place/time [x] Able to follow commands    [] Abnormal -     Eyes:   EOM    [x]  Normal    [] Abnormal -   Sclera  [x]  Normal    [] Abnormal -          Discharge [x]  None visible   [] Abnormal -     HENT: [x] Normocephalic, atraumatic  [] Abnormal -   [x] Mouth/Throat: Mucous membranes are moist    External Ears [x] Normal  [] Abnormal -    Neck: [x] No visualized mass [] Abnormal -     Pulmonary/Chest: [x] Respiratory effort normal   [x] No visualized signs of difficulty breathing or respiratory distress        [] Abnormal -      Musculoskeletal:   [x] Normal gait with no signs of ataxia         [x] Normal range of motion of neck        [] Abnormal -     Neurological:        [x] No Facial Asymmetry (Cranial nerve 7 motor function) (limited exam due to video visit)          [x] No gaze palsy        [] Abnormal -          Skin:        [x] No significant exanthematous lesions or discoloration noted on facial skin         [] Abnormal -            Psychiatric:       [x] Normal Affect [] Abnormal -        [x] No Hallucinations    Other pertinent observable physical exam findings:-    Assessment & Plan:   Diagnoses and all orders for this visit:    1. Anxiety  She reports that she has been stressed out because of her mom and taking care of her. She states that is physically, emotionally, and mentally tired and that she is overwhelmed. She states that she cannot get sleep at night and she has a support dog to help alleviate her stress and anxiety. Pt is currently on Cymbalta 30mg take 1 capsule orally daily. I have referred pt to case management.  -     REFERRAL TO AMBULATORY CARE  MANAGEMENT  -     DULoxetine (CYMBALTA) 30 mg capsule; Take 1 Capsule by mouth daily. 2. Acquired hypothyroidism  Pt's TSH level was 7.77 on 10/02/19. Pt continues with Lexothyroxine 137mcg take 1 tablet orally daily. Pt requests a refill of their medication, which I have granted. -     levothyroxine (Euthyrox) 137 mcg tablet; Take 1 Tablet by mouth Daily (before breakfast). 3. Hyperlipidemia, unspecified hyperlipidemia type  Lipid panel on 10/02/19 notable for total cholesterol 196, HDL 70, , and triglycerides 106. Pt continues with Lipitor 80mg take 1 tablet orally daily and Zetia 10mg take 1 tablet orally daily. Pt requests a refill of their medication, which I have granted. Pt will have updated lab work performed in the future. -     ezetimibe (ZETIA) 10 mg tablet; Take 1 Tablet by mouth daily. -     atorvastatin (LIPITOR) 80 mg tablet; Take 1 Tablet by mouth daily. 4. Essential hypertension  Pt continues with Lisinopril 20mg take 1 tablet orally daily. Pt requests a refill of their medication, which I have granted. -     lisinopriL (PRINIVIL, ZESTRIL) 20 mg tablet; Take 1 Tablet by mouth daily.     5. Major depressive disorder, recurrent episode, moderate ([facility]  Refer to #1.  -     REFERRAL TO AMBULATORY CARE  MANAGEMENT  -     DULoxetine (CYMBALTA) 30 mg capsule; Take 1 Capsule by mouth daily. 6. Tobacco abuse  The patient was counseled on the dangers of tobacco use, and was advised to quit. Reviewed strategies to maximize success, including removing cigarettes and smoking materials from environment, stress management and substitution of other forms of reinforcementPt reports that she cannot take her Chantix anymore. She states that when she was taking it, it would make her sick. Pt reports that she smokes a 1/2 a pack in 4-5 days. She is requesting for an alternative to Chantix. I have rx'd the pt Nicoderm 21mg/24hr, 14mg/24hr, and 7mg/24hr 1 patch transdermal route every 24 hours for 30 days. -     nicotine (NICODERM CQ) 21 mg/24 hr; 1 Patch by TransDERmal route every twenty-four (24) hours for 30 days. -     nicotine (NICODERM CQ) 14 mg/24 hr patch; 1 Patch by TransDERmal route every twenty-four (24) hours for 30 days. -     nicotine (NICODERM CQ) 7 mg/24 hr; 1 Patch by TransDERmal route every twenty-four (24) hours for 30 days. 7. Visit for screening mammogram  Pt is due for an updated mammogram; orders have been placed for this imaging to be performed. -     [facility] MAMMO BI SCREENING INCL CAD; Future    8. Encounter for Medicare annual wellness exam  Medicare questionnaire discussed and responses reviewed today in office. Pt reports that she drinks wine occasionally and not on a regualar basis. We discussed the expected course, resolution and complications of the diagnosis(es) in detail. Medication risks, benefits, costs, interactions, and alternatives were discussed as indicated. I advised her to contact the office if her condition worsens, changes or fails to improve as anticipated. She expressed understanding with the diagnosis(es) and plan.      Pursuant to the emergency declaration under the [address] and [place], [address] and Response Supplemental Appropriations Act, this Virtual Visit was conducted, with patient's consent, to reduce the patient's risk of exposure to COVID-19 and provide continuity of care for an established patient. Services were provided through a video synchronous discussion virtually to substitute for in-person clinic visit. Written by Delora Heimlich, scribe, as dictated by Lesley Ng M.D.    12:32 PM - 12:48 PM    Total time spent with the patient 16 minutes, greater than 50% of time spent counseling patient.

## 2021-11-08 NOTE — PROGRESS NOTES
This is the Subsequent Medicare Annual Wellness Exam, performed 12 months or more after the Initial AWV or the last Subsequent AWV    I have reviewed the patient's medical history in detail and updated the computerized patient record. Assessment/Plan   Education and counseling provided:  Are appropriate based on today's review and evaluation    1. Anxiety  -     REFERRAL TO AMBULATORY CARE  MANAGEMENT  2. Acquired hypothyroidism  3. Hyperlipidemia, unspecified hyperlipidemia type  4. Essential hypertension  5. Major depressive disorder, recurrent episode, moderate (HCC)  -     REFERRAL TO AMBULATORY CARE  MANAGEMENT  6. Tobacco abuse  7. Visit for screening mammogram       Depression Risk Factor Screening     3 most recent PHQ Screens 11/8/2021   Little interest or pleasure in doing things Not at all   Feeling down, depressed, irritable, or hopeless Not at all   Total Score PHQ 2 0   Trouble falling or staying asleep, or sleeping too much -   Feeling tired or having little energy -   Poor appetite, weight loss, or overeating -   Feeling bad about yourself - or that you are a failure or have let yourself or your family down -   Trouble concentrating on things such as school, work, reading, or watching TV -   Moving or speaking so slowly that other people could have noticed; or the opposite being so fidgety that others notice -   Thoughts of being better off dead, or hurting yourself in some way -   PHQ 9 Score -   How difficult have these problems made it for you to do your work, take care of your home and get along with others -       Alcohol Risk Screen    Do you average more than 1 drink per night or more than 7 drinks a week:  No    On any one occasion in the past three months have you have had more than 3 drinks containing alcohol:  No        Functional Ability and Level of Safety    Hearing: Hearing is good. Activities of Daily Living: The home contains: no safety equipment.   Patient does total self care      Ambulation: with no difficulty     Fall Risk:  Fall Risk Assessment, last 12 mths 11/12/2018   Able to walk? Yes   Fall in past 12 months?  No      Abuse Screen:  Patient is not abused       Cognitive Screening    Has your family/caregiver stated any concerns about your memory: no         Health Maintenance Due     Health Maintenance Due   Topic Date Due    COVID-19 Vaccine (1) Never done    Shingrix Vaccine Age 50> (1 of 2) Never done    Breast Cancer Screen Mammogram  11/06/2018    Lipid Screen  10/02/2020    Colorectal Cancer Screening Combo  10/30/2020    Medicare Yearly Exam  04/21/2021    Flu Vaccine (1) 09/01/2021       Patient Care Team   Patient Care Team:  Kristi Smith MD as PCP - Rikki Chávez MD as PCP - [facility] Empaneled Provider  Mane Albright MD (Otolaryngology)  Timothy Melendez MD as Consulting Provider (Endocrinology)    History     Patient Active Problem List   Diagnosis Code    Anemia D64.9    Hyperlipidemia E78.5    Vitamin D deficiency E55.9    Anxiety F41.9    Trochanteric bursitis M70.60    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    Hip pain, right M25.551    Nodule of right lung R91.1    Right shoulder pain M25.511    Anxiety disorder F41.9    Major depressive disorder F32.9    H/O papillary adenocarcinoma of thyroid Z85.850    Post-surgical hypothyroidism E89.0    Major depressive disorder, recurrent episode, moderate ([address] F33.1    Sleep apnea G47.30    MDD (major depressive disorder) F32.9    Diffusion capacity of lung (dl), decreased R94.2    Right leg pain M79.604    Degeneration of lumbar or lumbosacral intervertebral disc M51.37    Chronic back pain M54.9, G89.29    Lumbar facet arthropathy M47.816    Lumbar degenerative disc disease M51.36    Obesity, Class I, BMI 30-34.9 E66.9    Essential hypertension I10    Acquired hypothyroidism E03.9     Past Medical History:   Diagnosis Date    AC (acromioclavicular) joint bone spurs  Acid reflux     Anemia NEC     Arthritis     right arm     Cancer (HCC)     thyroid    Chronic pain     Depression     High cholesterol     Incontinence of urine     MDD (major depressive disorder)     Multiple thyroid nodules     Other ill-defined conditions(799.89)     right shoulder pain    Sleep apnea 2013    Thyroid disease       Past Surgical History:   Procedure Laterality Date    HX BREAST BIOPSY Right 2009    US; neg    HX  SECTION      HX HERNIA REPAIR      HX HYSTERECTOMY       Current Outpatient Medications   Medication Sig Dispense Refill    levothyroxine (Euthyrox) 137 mcg tablet Take 1 Tablet by mouth Daily (before breakfast). 30 Tablet 5    atorvastatin (LIPITOR) 80 mg tablet Take 1 tablet by mouth once daily 30 Tablet 0    ezetimibe (ZETIA) 10 mg tablet Take 1 tablet by mouth once daily 30 Tablet 0    lisinopriL (PRINIVIL, ZESTRIL) 20 mg tablet Take 1 tablet by mouth once daily 30 Tablet 0    DULoxetine (CYMBALTA) 30 mg capsule Take 1 Cap by mouth daily. 30 Cap 5    cholecalciferol (Vitamin D3) (1000 Units /25 mcg) tablet Take 1 Tab by mouth daily. 90 Tab 1    multivitamin (ONE A DAY) tablet Take 1 Tab by mouth daily.  90 Tab 1     Allergies   Allergen Reactions    Pcn [Penicillins] Hives, Shortness of Breath and Itching    Bactrim [Sulfamethoprim Ds] Unable to Obtain       Family History   Problem Relation Age of Onset    Hypertension Mother    [facility] Arthritis-rheumatoid Mother     Cancer Father         colon    Diabetes Father     Cancer Sister         breast cancer    Diabetes Sister     Thyroid Disease Sister         thyroid cancer    Breast Cancer Sister 64    Hypertension Brother     Cancer Brother         colon    Cancer Sister         thyroid    Seizures Sister     Cancer Paternal Grandfather         prostate      Social History     Tobacco Use    Smoking status: Current Every Day Smoker     Years: 30.00     Last attempt to quit: 2013 Years since quittin.4    Smokeless tobacco: Never Used    Tobacco comment: 6-7 CIGS PER DAY   Substance Use Topics    Alcohol use: No         Luís Peters MD

## 2021-11-17 NOTE — TELEPHONE ENCOUNTER
Patient is waiting on Patches to be called to her Pharmacy they were never sent   Insurance denied - this is a OTC product    Patient notified.      nicotine (NICODERM CQ) 21 mg/24 hr [[id number]]     Order Details  Dose: 1 Patch Route: TransDERmal Frequency: EVERY 24 HOURS   Dispense Quantity: 30 Patch Refills: 0          Si Patch by TransDERmal route every twenty-four (24) hours for 30 days.         Start Date: 21 End Date: 21 after 30 doses   Written Date: 21 Expiration Date: --       Diagnosis Association: Tobacco abuse (Z72.0)     Providers    Authorizing Provider:    MD Roverto Greenberg [place] [id number]   Phone:  [phone]   Fax:  [phone]   ANA LAURA #:  [id number]   NPI:  [id number]        Ordering User:  Russell Greenberg [address] [place] [address], [address] [place] [address]   Phone:  [phone]  Fax:  [phone]   ANA LAURA #:  --   TANNER Reason: --       Outpatient Medication Detail     Disp Refills Start End    nicotine (NICODERM CQ) 21 mg/24 hr 30 Patch 0 2021    Sig - Route: 1 Patch by TransDERmal route every twenty-four (24) hours for 30 days. - TransDERmal    Sent to pharmacy as: nicotine 21 mg/24 hr daily transdermal patch (Sharyn Curiel)    E-Prescribing Status: Receipt confirmed by pharmacy (2021 12:48 PM EST)

## 2022-02-17 DIAGNOSIS — E78.5 HYPERLIPIDEMIA, UNSPECIFIED HYPERLIPIDEMIA TYPE: ICD-10-CM

## 2022-02-17 DIAGNOSIS — I10 ESSENTIAL HYPERTENSION: ICD-10-CM

## 2022-02-17 DIAGNOSIS — E03.9 ACQUIRED HYPOTHYROIDISM: ICD-10-CM

## 2022-02-19 RX ORDER — EZETIMIBE 10 MG/1
TABLET ORAL
Qty: 90 TABLET | Refills: 0 | Status: SHIPPED | OUTPATIENT
Start: 2022-02-19

## 2022-02-19 RX ORDER — LISINOPRIL 20 MG/1
TABLET ORAL
Qty: 90 TABLET | Refills: 0 | Status: SHIPPED | OUTPATIENT
Start: 2022-02-19

## 2022-02-19 RX ORDER — LEVOTHYROXINE SODIUM 137 UG/1
TABLET ORAL
Qty: 90 TABLET | Refills: 0 | Status: SHIPPED | OUTPATIENT
Start: 2022-02-19

## 2022-02-19 RX ORDER — ATORVASTATIN CALCIUM 80 MG/1
TABLET, FILM COATED ORAL
Qty: 90 TABLET | Refills: 0 | Status: SHIPPED | OUTPATIENT
Start: 2022-02-19

## 2023-01-13 NOTE — TELEPHONE ENCOUNTER
Bruce Aleman     -       [phone]     -  Patient requesting to speak to Cristian regarding Nicotine Patches shortness of breath

## 2023-03-20 ENCOUNTER — VIRTUAL VISIT (OUTPATIENT)
Dept: FAMILY MEDICINE CLINIC | Age: 63
End: 2023-03-20
Payer: MEDICARE

## 2023-03-20 DIAGNOSIS — Z00.00 ENCOUNTER FOR MEDICARE ANNUAL WELLNESS EXAM: ICD-10-CM

## 2023-03-20 DIAGNOSIS — Z12.31 VISIT FOR SCREENING MAMMOGRAM: ICD-10-CM

## 2023-03-20 DIAGNOSIS — E03.9 ACQUIRED HYPOTHYROIDISM: ICD-10-CM

## 2023-03-20 DIAGNOSIS — Z12.11 COLON CANCER SCREENING: ICD-10-CM

## 2023-03-20 DIAGNOSIS — F41.9 ANXIETY: ICD-10-CM

## 2023-03-20 DIAGNOSIS — E78.5 HYPERLIPIDEMIA, UNSPECIFIED HYPERLIPIDEMIA TYPE: ICD-10-CM

## 2023-03-20 DIAGNOSIS — I10 ESSENTIAL HYPERTENSION: ICD-10-CM

## 2023-03-20 DIAGNOSIS — F33.1 MAJOR DEPRESSIVE DISORDER, RECURRENT EPISODE, MODERATE (HCC): ICD-10-CM

## 2023-03-20 DIAGNOSIS — J30.1 SEASONAL ALLERGIC RHINITIS DUE TO POLLEN: Primary | ICD-10-CM

## 2023-03-20 PROCEDURE — 99214 OFFICE O/P EST MOD 30 MIN: CPT | Performed by: FAMILY MEDICINE

## 2023-03-20 PROCEDURE — G8427 DOCREV CUR MEDS BY ELIG CLIN: HCPCS | Performed by: FAMILY MEDICINE

## 2023-03-20 PROCEDURE — G0439 PPPS, SUBSEQ VISIT: HCPCS | Performed by: FAMILY MEDICINE

## 2023-03-20 PROCEDURE — G9899 SCRN MAM PERF RSLTS DOC: HCPCS | Performed by: FAMILY MEDICINE

## 2023-03-20 PROCEDURE — G8421 BMI NOT CALCULATED: HCPCS | Performed by: FAMILY MEDICINE

## 2023-03-20 PROCEDURE — 3017F COLORECTAL CA SCREEN DOC REV: CPT | Performed by: FAMILY MEDICINE

## 2023-03-20 PROCEDURE — G9717 DOC PT DX DEP/BP F/U NT REQ: HCPCS | Performed by: FAMILY MEDICINE

## 2023-03-20 RX ORDER — FLUTICASONE PROPIONATE 50 MCG
2 SPRAY, SUSPENSION (ML) NASAL DAILY
Qty: 1 EACH | Refills: 5 | Status: SHIPPED | OUTPATIENT
Start: 2023-03-20

## 2023-03-20 RX ORDER — LEVOTHYROXINE SODIUM 137 UG/1
137 TABLET ORAL
Qty: 90 TABLET | Refills: 0 | Status: SHIPPED | OUTPATIENT
Start: 2023-03-20

## 2023-03-20 RX ORDER — EZETIMIBE 10 MG/1
10 TABLET ORAL DAILY
Qty: 90 TABLET | Refills: 0 | Status: SHIPPED | OUTPATIENT
Start: 2023-03-20

## 2023-03-20 RX ORDER — LISINOPRIL 20 MG/1
20 TABLET ORAL DAILY
Qty: 90 TABLET | Refills: 0 | Status: SHIPPED | OUTPATIENT
Start: 2023-03-20

## 2023-03-20 RX ORDER — DULOXETIN HYDROCHLORIDE 30 MG/1
30 CAPSULE, DELAYED RELEASE ORAL DAILY
Qty: 30 CAPSULE | Refills: 3 | Status: SHIPPED | OUTPATIENT
Start: 2023-03-20

## 2023-03-20 RX ORDER — ATORVASTATIN CALCIUM 80 MG/1
80 TABLET, FILM COATED ORAL DAILY
Qty: 90 TABLET | Refills: 0 | Status: SHIPPED | OUTPATIENT
Start: 2023-03-20

## 2023-03-20 RX ORDER — MINERAL OIL
180 ENEMA (ML) RECTAL DAILY
Qty: 30 TABLET | Refills: 5 | Status: SHIPPED | OUTPATIENT
Start: 2023-03-20

## 2023-03-20 NOTE — PROGRESS NOTES
Mono Ocampo is a 58 y.o. female who was seen by synchronous (real-time) audio-video technology on 3/20/2023. Consent:  Services were provided through a video synchronous discussion virtually to substitute for in-person appointment. She and/or her healthcare decision maker is aware that this patient-initiated Telehealth encounter is a billable service, with coverage as determined by her insurance carrier. She is aware that she may receive a bill and has provided verbal consent to proceed: Yes    I was in the office while conducting this encounter. Subjective:   Mono Ocampo was seen for routine follow up. Pt has been taking care of her 81yo mother who recently had a stroke. She doesn't want to send her mom to a nursing home due to a previous bad experience. Anxiety: Pt reports an increase in anxiety and has been feeling overwhelmed caring for her mother. She reports she doesn't have time to take care of herself. She is interested in speaking with someone and starting medication to help with her anxiety. Hypertension: Pt continues with Lisinopril 20mg daily. Hypothyroidism: Pt's TSH level was 7.770 on 10/2/19. Pt continues with Euthyrox 137mcg daily. Hyperlipidemia: Lipid panel on 10/2/19 notable for total cholesterol 196, HDL 70, , and triglycerides 106. Pt continues with Lipitor 80mg daily and Zetia 10mg daily. Seasonal allergic rhinitis due to pollen: Pt reports left inner ear ache due to seasonal allergies. Prior to Admission medications    Medication Sig Start Date End Date Taking?  Authorizing Provider   Euthyrox 137 mcg tablet TAKE 1 TABLET BY MOUTH ONCE DAILY BEFORE BREAKFAST 2/19/22  Yes Lynda Lynne MD   atorvastatin (LIPITOR) 80 mg tablet Take 1 tablet by mouth once daily 2/19/22  Yes Lynda Lynne MD   ezetimibe (ZETIA) 10 mg tablet Take 1 tablet by mouth once daily 2/19/22  Yes Lynda Lynne MD   lisinopriL (PRINIVIL, ZESTRIL) 20 mg tablet Take 1 tablet by mouth once daily 22  Yes Joseph Abbott MD   DULoxetine (CYMBALTA) 30 mg capsule Take 1 Capsule by mouth daily. 21  Yes Joseph Abbott MD   cholecalciferol (Vitamin D3) (1000 Units /25 mcg) tablet Take 1 Tab by mouth daily. 21  Yes Joseph Abbott MD   multivitamin (ONE A DAY) tablet Take 1 Tab by mouth daily. 20  Yes Joseph Abbott MD     Allergies   Allergen Reactions    Pcn [Penicillins] Hives, Shortness of Breath and Itching    Bactrim [Sulfamethoprim Ds] Unable to Obtain     Past Medical History:   Diagnosis Date    AC (acromioclavicular) joint bone spurs     Acid reflux     Anemia NEC     Arthritis     right arm     Cancer ([address]     thyroid    Chronic pain     Depression     High cholesterol     Incontinence of urine     MDD (major depressive disorder)     Multiple thyroid nodules     Other ill-defined conditions(799.89)     right shoulder pain    Sleep apnea 2013    Thyroid disease      Past Surgical History:   Procedure Laterality Date    HX BREAST BIOPSY Right     US; neg    HX  SECTION      HX HERNIA REPAIR      HX HYSTERECTOMY       Family History   Problem Relation Age of Onset    Hypertension Mother     Arthritis-rheumatoid Mother     Cancer Father         colon    Diabetes Father     Cancer Sister         breast cancer    Diabetes Sister     Thyroid Disease Sister         thyroid cancer    Breast Cancer Sister 64    Hypertension Brother     Cancer Brother         colon    Cancer Sister         thyroid    Seizures Sister     Cancer Paternal Grandfather         prostate      Social History     Tobacco Use    Smoking status: Every Day     Years: 30.00     Types: Cigarettes     Last attempt to quit: 2013     Years since quittin.8    Smokeless tobacco: Never    Tobacco comments:     6-7 CIGS PER DAY   Substance Use Topics    Alcohol use: No        Review of Systems   Constitutional:  Negative for chills and fever.    HENT:  Negative for hearing loss and tinnitus. Eyes:  Negative for blurred vision and double vision. Respiratory:  Negative for cough and shortness of breath. Cardiovascular:  Negative for chest pain and palpitations. Gastrointestinal:  Negative for nausea and vomiting. Genitourinary:  Negative for dysuria and frequency. Musculoskeletal:  Negative for back pain and falls. Skin:  Negative for itching and rash. Neurological:  Negative for dizziness, loss of consciousness and headaches. Endo/Heme/Allergies:  Positive for environmental allergies. Psychiatric/Behavioral:  Negative for depression. The patient is nervous/anxious. PHYSICAL EXAMINATION:  Vital Signs: (As obtained by patient/caregiver at home)  There were no vitals taken for this visit.      Constitutional: [x] Appears well-developed and well-nourished [x] No apparent distress      [] Abnormal -     Mental status: [x] Alert and awake  [x] Oriented to person/place/time [x] Able to follow commands    [] Abnormal -     Eyes:   EOM    [x]  Normal    [] Abnormal -   Sclera  [x]  Normal    [] Abnormal -          Discharge [x]  None visible   [] Abnormal -     HENT: [x] Normocephalic, atraumatic  [] Abnormal -   [x] Mouth/Throat: Mucous membranes are moist    External Ears [x] Normal  [] Abnormal -    Neck: [x] No visualized mass [] Abnormal -     Pulmonary/Chest: [x] Respiratory effort normal   [x] No visualized signs of difficulty breathing or respiratory distress        [] Abnormal -      Musculoskeletal:   [x] Normal gait with no signs of ataxia         [x] Normal range of motion of neck        [] Abnormal -     Neurological:        [x] No Facial Asymmetry (Cranial nerve 7 motor function) (limited exam due to video visit)          [x] No gaze palsy        [] Abnormal -          Skin:        [x] No significant exanthematous lesions or discoloration noted on facial skin         [] Abnormal -            Psychiatric:       [x] Normal Affect [] Abnormal - [x] No Hallucinations    Other pertinent observable physical exam findings:-    Assessment & Plan:   Diagnoses and all orders for this visit:    1. Seasonal allergic rhinitis due to pollen  I recommended using Flonase and Allegra (prescriptions sent). -     fexofenadine (ALLEGRA) 180 mg tablet; Take 1 Tablet by mouth daily. -     fluticasone propionate (FLONASE) 50 mcg/actuation nasal spray; 2 Sprays by Both Nostrils route daily. 2. Major depressive disorder, recurrent episode, moderate ([address]  I recommended starting Cymbalta. She has been on this before and reports it helped. I also referred pt to Ebonie Moran LCSW. Assessment & Plan:   well controlled,   Orders:  -     DULoxetine (CYMBALTA) 30 mg capsule; Take 1 Capsule by mouth daily.  -     REFERRAL TO SOCIAL WORK    3. Acquired hypothyroidism  Presumed stable. Continue current regimen. Pt requests a refill of their medication, which I have granted. Recheck TSH. -     levothyroxine (Euthyrox) 137 mcg tablet; Take 1 Tablet by mouth Daily (before breakfast). -     TSH 3RD GENERATION; Future    4. Hyperlipidemia, unspecified hyperlipidemia type  Continue current regimen. Pt requests a refill of their medication, which I have granted. Recheck CMP and lipid panel.   -     atorvastatin (LIPITOR) 80 mg tablet; Take 1 Tablet by mouth daily. -     ezetimibe (ZETIA) 10 mg tablet; Take 1 Tablet by mouth daily.  -     METABOLIC PANEL, COMPREHENSIVE; Future  -     LIPID PANEL; Future    5. Essential hypertension  Continue current regimen. Pt requests a refill of their medication, which I have granted. Recheck CMP and lipid panel.   -     lisinopriL (PRINIVIL, ZESTRIL) 20 mg tablet; Take 1 Tablet by mouth daily.  -     METABOLIC PANEL, COMPREHENSIVE; Future  -     CBC WITH AUTOMATED DIFF; Future    6. Anxiety  See #2.   -     DULoxetine (CYMBALTA) 30 mg capsule; Take 1 Capsule by mouth daily.     7. Visit for screening mammogram  Pt is due for an updated mammogram; orders have been placed for this imaging to be performed. -     [facility] MAMMO BI SCREENING INCL CAD; Future    8. Colon cancer screening  Ordered stool test.   -     OCCULT BLOOD IMMUNOASSAY,DIAGNOSTIC; Future    9. Encounter for Medicare annual wellness exam  Medicare questionnaire discussed and responses reviewed today in office. We discussed the expected course, resolution and complications of the diagnosis(es) in detail. Medication risks, benefits, costs, interactions, and alternatives were discussed as indicated. I advised her to contact the office if her condition worsens, changes or fails to improve as anticipated. She expressed understanding with the diagnosis(es) and plan. Pursuant to the emergency declaration under the [address] and [facility] waiver authority and the [employer] General Act, this Virtual Visit was conducted, with patient's consent, to reduce the patient's risk of exposure to COVID-19 and provide continuity of care for an established patient. Services were provided through a video synchronous discussion virtually to substitute for in-person clinic visit. Written by paulina Mendoza, as dictated by Sheri Bruce M.D. Total time spent with the patient 15 minutes, greater than 50% of time spent counseling patient.

## 2023-03-20 NOTE — PROGRESS NOTES
This is the Subsequent Medicare Annual Wellness Exam, performed 12 months or more after the Initial AWV or the last Subsequent AWV    I have reviewed the patient's medical history in detail and updated the computerized patient record. Assessment/Plan   Education and counseling provided:  Are appropriate based on today's review and evaluation    1. Major depressive disorder, recurrent episode, moderate (HCC)  Assessment & Plan:   well controlled,   The following orders have not been finalized:  Orders:  -     DULoxetine (CYMBALTA) 30 mg capsule  2. Acquired hypothyroidism  The following orders have not been finalized:  -     levothyroxine (Euthyrox) 137 mcg tablet  3. Hyperlipidemia, unspecified hyperlipidemia type  The following orders have not been finalized:  -     ezetimibe (ZETIA) 10 mg tablet  -     atorvastatin (LIPITOR) 80 mg tablet  4. Essential hypertension  The following orders have not been finalized:  -     lisinopriL (PRINIVIL, ZESTRIL) 20 mg tablet  5. Anxiety  The following orders have not been finalized:  -     DULoxetine (CYMBALTA) 30 mg capsule  6. Visit for screening mammogram  7.  Colon cancer screening       Depression Risk Factor Screening     3 most recent PHQ Screens 3/20/2023   Little interest or pleasure in doing things Not at all   Feeling down, depressed, irritable, or hopeless Not at all   Total Score PHQ 2 0   Trouble falling or staying asleep, or sleeping too much -   Feeling tired or having little energy -   Poor appetite, weight loss, or overeating -   Feeling bad about yourself - or that you are a failure or have let yourself or your family down -   Trouble concentrating on things such as school, work, reading, or watching TV -   Moving or speaking so slowly that other people could have noticed; or the opposite being so fidgety that others notice -   Thoughts of being better off dead, or hurting yourself in some way -   PHQ 9 Score -   How difficult have these problems made it for you to do your work, take care of your home and get along with others -       Alcohol & Drug Abuse Risk Screen    Do you average more than 1 drink per night or more than 7 drinks a week:  No    On any one occasion in the past three months have you have had more than 3 drinks containing alcohol:  No          Functional Ability and Level of Safety    Hearing: Hearing is good. Activities of Daily Living: The home contains: no safety equipment. Patient does total self care      Ambulation: with no difficulty     Fall Risk:  Fall Risk Assessment, last 12 mths 11/12/2018   Able to walk? Yes   Fall in past 12 months?  No      Abuse Screen:  Patient is not abused       Cognitive Screening    Has your family/caregiver stated any concerns about your memory: no         Health Maintenance Due     Health Maintenance Due   Topic Date Due    COVID-19 Vaccine (1) Never done    DTaP/Tdap/Td series (1 - Tdap) Never done    Shingles Vaccine (1 of 2) Never done    Breast Cancer Screen Mammogram  11/06/2018    Lipid Screen  10/02/2020    Colorectal Cancer Screening Combo  10/30/2020    Flu Vaccine (1) 08/01/2022    Medicare Yearly Exam  11/09/2022       Patient Care Team   Patient Care Team:  Emma Lin MD as PCP - Christy Colin MD as PCP - [facility] Param Ballesteros Provider  Josselin Culver MD (Otolaryngology)  Clearance MD Jocelyn as Consulting Provider (Endocrinology Physician)    History     Patient Active Problem List   Diagnosis Code    Anemia D64.9    Hyperlipidemia E78.5    Vitamin D deficiency E55.9    Anxiety F41.9    Trochanteric bursitis M70.60    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    Hip pain, right M25.551    Nodule of right lung R91.1    Right shoulder pain M25.511    Anxiety disorder F41.9    Major depressive disorder F32.9    H/O papillary adenocarcinoma of thyroid Z85.850    Post-surgical hypothyroidism E89.0    Major depressive disorder, recurrent episode, moderate ([address] F33.1    Sleep apnea G47.30    MDD (major depressive disorder) F32.9    Diffusion capacity of lung (dl), decreased R94.2    Right leg pain M79.604    Degeneration of lumbar or lumbosacral intervertebral disc M51.37    Chronic back pain M54.9, G89.29    Lumbar facet arthropathy M47.816    Lumbar degenerative disc disease M51.36    Obesity, Class I, BMI 30-34.9 E66.9    Essential hypertension I10    Acquired hypothyroidism E03.9     Past Medical History:   Diagnosis Date    AC (acromioclavicular) joint bone spurs     Acid reflux     Anemia NEC     Arthritis     right arm     Cancer (HCC)     thyroid    Chronic pain     Depression     High cholesterol     Incontinence of urine     MDD (major depressive disorder)     Multiple thyroid nodules     Other ill-defined conditions(799.89)     right shoulder pain    Sleep apnea     Thyroid disease       Past Surgical History:   Procedure Laterality Date    HX BREAST BIOPSY Right 2009    US; neg    HX  SECTION      HX HERNIA REPAIR      HX HYSTERECTOMY       Current Outpatient Medications   Medication Sig Dispense Refill    Euthyrox 137 mcg tablet TAKE 1 TABLET BY MOUTH ONCE DAILY BEFORE BREAKFAST 90 Tablet 0    atorvastatin (LIPITOR) 80 mg tablet Take 1 tablet by mouth once daily 90 Tablet 0    ezetimibe (ZETIA) 10 mg tablet Take 1 tablet by mouth once daily 90 Tablet 0    lisinopriL (PRINIVIL, ZESTRIL) 20 mg tablet Take 1 tablet by mouth once daily 90 Tablet 0    DULoxetine (CYMBALTA) 30 mg capsule Take 1 Capsule by mouth daily. 30 Capsule 3    cholecalciferol (Vitamin D3) (1000 Units /25 mcg) tablet Take 1 Tab by mouth daily. 90 Tab 1    multivitamin (ONE A DAY) tablet Take 1 Tab by mouth daily.  90 Tab 1     Allergies   Allergen Reactions    Pcn [Penicillins] Hives, Shortness of Breath and Itching    Bactrim [Sulfamethoprim Ds] Unable to Obtain       Family History   Problem Relation Age of Onset    Hypertension Mother     Arthritis-rheumatoid Mother     Cancer Father colon    Diabetes Father     Cancer Sister         breast cancer    Diabetes Sister     Thyroid Disease Sister         thyroid cancer    Breast Cancer Sister 64    Hypertension Brother     Cancer Brother         colon    Cancer Sister         thyroid    Seizures Sister     Cancer Paternal Grandfather         prostate      Social History     Tobacco Use    Smoking status: Every Day     Years: 30.00     Types: Cigarettes     Last attempt to quit: 2013     Years since quittin.8    Smokeless tobacco: Never    Tobacco comments:     6-7 CIGS PER DAY   Substance Use Topics    Alcohol use: No         Ryder Wagoner MD

## 2023-10-31 RX ORDER — ATORVASTATIN CALCIUM 80 MG/1
TABLET, FILM COATED ORAL
Qty: 30 TABLET | Refills: 0 | Status: SHIPPED | OUTPATIENT
Start: 2023-10-31 | End: 2023-12-12 | Stop reason: SDUPTHER

## 2023-10-31 RX ORDER — LEVOTHYROXINE SODIUM 137 UG/1
TABLET ORAL
Qty: 30 TABLET | Refills: 0 | Status: SHIPPED | OUTPATIENT
Start: 2023-10-31 | End: 2023-12-12 | Stop reason: SDUPTHER

## 2023-10-31 RX ORDER — LISINOPRIL 20 MG/1
TABLET ORAL
Qty: 30 TABLET | Refills: 0 | Status: SHIPPED | OUTPATIENT
Start: 2023-10-31 | End: 2023-12-12 | Stop reason: SDUPTHER

## 2023-10-31 RX ORDER — EZETIMIBE 10 MG/1
TABLET ORAL
Qty: 30 TABLET | Refills: 0 | Status: SHIPPED | OUTPATIENT
Start: 2023-10-31 | End: 2023-12-12 | Stop reason: SDUPTHER

## 2023-11-20 NOTE — TELEPHONE ENCOUNTER
[phone] notified patient needs OV per patient will call back to make appointment Chief Complaint   Patient presents with    Derm Problem     Patient is here today for a 3 week takedown procedure.     Maite GUTIERREZ RN

## 2023-11-28 ENCOUNTER — NURSE TRIAGE (OUTPATIENT)
Dept: OTHER | Facility: CLINIC | Age: 63
End: 2023-11-28

## 2023-11-28 NOTE — TELEPHONE ENCOUNTER
[facility]    [place] call from [place] [facility] with [employer]. Subjective: Caller states \"I want to get an appointment so he () can look at my arm and my ear\"     Current Symptoms: Left arm and shoulder pain, upper left arm swelling, left ear pain    Unable to lift left arm above head due to pain, states has been to therapy for left arm pain in the past    Patients states she is caring for her mother who needs turned every 2 hours which is aggravating her left arm pain but is still able to use left arm and left hand    Onset: States \"a long time\"    Pain Severity: declined to give pain scale number described pain as aggravating    Temperature: denies     What has been tried: Aspercreme, ibuprofen      Recommended disposition: See in Office Within 2 Weeks    Care advice provided, patient verbalizes understanding; denies any other questions or concerns; instructed to call back for any new or worsening symptoms. Patient/Caller agrees with recommended disposition; writer provided warm transfer to [employer] at [facility] for appointment scheduling    Attention Provider: Thank you for allowing me to participate in the care of your patient. The patient was connected to triage in response to information provided to the ECC/PSC. Please do not respond through this encounter as the response is not directed to a shared pool.       Reason for Disposition   MILD pain (e.g., does not interfere with normal activities) and present > 7 days    Protocols used: Arm Pain-ADULT-[place]

## 2023-12-04 ENCOUNTER — TELEPHONE (OUTPATIENT)
Age: 63
End: 2023-12-04

## 2023-12-04 ENCOUNTER — TELEMEDICINE (OUTPATIENT)
Age: 63
End: 2023-12-04
Payer: MEDICARE

## 2023-12-04 DIAGNOSIS — E03.9 ACQUIRED HYPOTHYROIDISM: Primary | ICD-10-CM

## 2023-12-04 DIAGNOSIS — Z11.59 NEED FOR HEPATITIS C SCREENING TEST: ICD-10-CM

## 2023-12-04 DIAGNOSIS — E78.2 MIXED HYPERLIPIDEMIA: ICD-10-CM

## 2023-12-04 DIAGNOSIS — F33.1 MAJOR DEPRESSIVE DISORDER, RECURRENT, MODERATE (HCC): ICD-10-CM

## 2023-12-04 DIAGNOSIS — E55.9 VITAMIN D DEFICIENCY: ICD-10-CM

## 2023-12-04 DIAGNOSIS — Z63.6 CAREGIVER STRESS: ICD-10-CM

## 2023-12-04 DIAGNOSIS — I10 ESSENTIAL (PRIMARY) HYPERTENSION: ICD-10-CM

## 2023-12-04 DIAGNOSIS — Z11.4 SCREENING FOR HIV WITHOUT PRESENCE OF RISK FACTORS: ICD-10-CM

## 2023-12-04 PROCEDURE — 99214 OFFICE O/P EST MOD 30 MIN: CPT | Performed by: FAMILY MEDICINE

## 2023-12-04 PROCEDURE — 3017F COLORECTAL CA SCREEN DOC REV: CPT | Performed by: FAMILY MEDICINE

## 2023-12-04 PROCEDURE — G8427 DOCREV CUR MEDS BY ELIG CLIN: HCPCS | Performed by: FAMILY MEDICINE

## 2023-12-04 SDOH — SOCIAL STABILITY - SOCIAL INSECURITY: DEPENDENT RELATIVE NEEDING CARE AT HOME: Z63.6

## 2023-12-04 NOTE — TELEPHONE ENCOUNTER
Patient called stated she was seen the provider today and forgot to tell him about her arm` problems. Would like for the nurse to give her a call.     Requesting a call back    Best call back #[phone]

## 2023-12-04 NOTE — PROGRESS NOTES
Penicillins Hives, Itching and Shortness Of Breath    Sulfa Antibiotics      Other reaction(s): Unable to Obtain     Past Medical History:   Diagnosis Date    AC (acromioclavicular) joint bone spurs     Acid reflux     Arthritis     right arm     Cancer (HCC)     thyroid    Chronic pain     Depression     High cholesterol     Incontinence of urine     MDD (major depressive disorder)     Multiple thyroid nodules     Other ill-defined conditions(799.89)     right shoulder pain    Sleep apnea 2013    Thyroid disease      Past Surgical History:   Procedure Laterality Date    BREAST BIOPSY Right 2009    US; neg     [address] (CERVIX STATUS UNKNOWN)       Family History   Problem Relation Age of Onset    Diabetes Sister     Seizures Sister     Cancer Sister         breast cancer    Cancer Paternal Grandfather         prostate     Hypertension Mother     Rheum Arthritis Mother     Cancer Father         colon    Cancer Brother         colon    Hypertension Brother     Breast Cancer Sister 64    Thyroid Disease Sister         thyroid cancer    Diabetes Father     Cancer Sister         thyroid     Social History     Tobacco Use    Smoking status: Every Day     Types: Cigarettes     Last attempt to quit: 2013     Years since quitting: 10.5    Smokeless tobacco: Never    Tobacco comments:     Quit smokin-7 CIGS PER DAY   Substance Use Topics    Alcohol use: No        Review of Systems      PHYSICAL EXAMINATION:  Vital Signs: (As obtained by patient/caregiver at home)  There were no vitals taken for this visit.      Constitutional: [x] Appears well-developed and well-nourished [x] No apparent distress      [] Abnormal -     Mental status: [x] Alert and awake  [x] Oriented to person/place/time [x] Able to follow commands    [] Abnormal -     Eyes:   EOM    [x]  Normal    [] Abnormal -   Sclera  [x]  Normal    [] Abnormal -          Discharge [x]  None visible   []

## 2023-12-06 NOTE — TELEPHONE ENCOUNTER
Patient has been scheduled for 12/12/2023 in clinic visit can discuss additional concerns then.  Needs labs

## 2023-12-12 ENCOUNTER — OFFICE VISIT (OUTPATIENT)
Age: 63
End: 2023-12-12
Payer: MEDICARE

## 2023-12-12 VITALS
SYSTOLIC BLOOD PRESSURE: 100 MMHG | HEART RATE: 72 BPM | OXYGEN SATURATION: 99 % | HEIGHT: 67 IN | TEMPERATURE: 97.9 F | BODY MASS INDEX: 28.25 KG/M2 | WEIGHT: 180 LBS | RESPIRATION RATE: 16 BRPM | DIASTOLIC BLOOD PRESSURE: 88 MMHG

## 2023-12-12 DIAGNOSIS — E03.9 ACQUIRED HYPOTHYROIDISM: ICD-10-CM

## 2023-12-12 DIAGNOSIS — Z13.1 ENCOUNTER FOR SCREENING FOR DIABETES MELLITUS: ICD-10-CM

## 2023-12-12 DIAGNOSIS — M67.912 TENDINOPATHY OF LEFT ROTATOR CUFF: ICD-10-CM

## 2023-12-12 DIAGNOSIS — Z12.11 SCREEN FOR COLON CANCER: ICD-10-CM

## 2023-12-12 DIAGNOSIS — Z11.4 SCREENING FOR HIV WITHOUT PRESENCE OF RISK FACTORS: ICD-10-CM

## 2023-12-12 DIAGNOSIS — Z23 FLU VACCINE NEED: ICD-10-CM

## 2023-12-12 DIAGNOSIS — Z23 NEED FOR VACCINATION WITH 20-POLYVALENT PNEUMOCOCCAL CONJUGATE VACCINE: ICD-10-CM

## 2023-12-12 DIAGNOSIS — Z12.11 COLON CANCER SCREENING: ICD-10-CM

## 2023-12-12 DIAGNOSIS — E78.2 MIXED HYPERLIPIDEMIA: Primary | ICD-10-CM

## 2023-12-12 DIAGNOSIS — E55.9 VITAMIN D DEFICIENCY: ICD-10-CM

## 2023-12-12 DIAGNOSIS — I10 ESSENTIAL (PRIMARY) HYPERTENSION: ICD-10-CM

## 2023-12-12 DIAGNOSIS — F41.1 GENERALIZED ANXIETY DISORDER: ICD-10-CM

## 2023-12-12 DIAGNOSIS — Z12.31 SCREENING MAMMOGRAM FOR BREAST CANCER: ICD-10-CM

## 2023-12-12 DIAGNOSIS — Z11.59 NEED FOR HEPATITIS C SCREENING TEST: ICD-10-CM

## 2023-12-12 DIAGNOSIS — J30.1 ALLERGIC RHINITIS DUE TO POLLEN, UNSPECIFIED SEASONALITY: ICD-10-CM

## 2023-12-12 PROCEDURE — G8419 CALC BMI OUT NRM PARAM NOF/U: HCPCS | Performed by: FAMILY MEDICINE

## 2023-12-12 PROCEDURE — 99215 OFFICE O/P EST HI 40 MIN: CPT | Performed by: FAMILY MEDICINE

## 2023-12-12 PROCEDURE — 3074F SYST BP LT 130 MM HG: CPT | Performed by: FAMILY MEDICINE

## 2023-12-12 PROCEDURE — G8482 FLU IMMUNIZE ORDER/ADMIN: HCPCS | Performed by: FAMILY MEDICINE

## 2023-12-12 PROCEDURE — G8427 DOCREV CUR MEDS BY ELIG CLIN: HCPCS | Performed by: FAMILY MEDICINE

## 2023-12-12 PROCEDURE — 3017F COLORECTAL CA SCREEN DOC REV: CPT | Performed by: FAMILY MEDICINE

## 2023-12-12 PROCEDURE — 3078F DIAST BP <80 MM HG: CPT | Performed by: FAMILY MEDICINE

## 2023-12-12 PROCEDURE — PBSHW INFLUENZA, FLUCELVAX, (AGE 6 MO+), IM, PF, 0.5 ML: Performed by: FAMILY MEDICINE

## 2023-12-12 PROCEDURE — PBSHW PNEUMOCOCCAL, PCV20, PREVNAR 20, (AGE 6W+), IM, PF: Performed by: FAMILY MEDICINE

## 2023-12-12 PROCEDURE — 90677 PCV20 VACCINE IM: CPT | Performed by: FAMILY MEDICINE

## 2023-12-12 PROCEDURE — 4004F PT TOBACCO SCREEN RCVD TLK: CPT | Performed by: FAMILY MEDICINE

## 2023-12-12 PROCEDURE — 90674 CCIIV4 VAC NO PRSV 0.5 ML IM: CPT | Performed by: FAMILY MEDICINE

## 2023-12-12 RX ORDER — FEXOFENADINE HCL 180 MG/1
180 TABLET ORAL DAILY
Qty: 90 TABLET | Refills: 3 | Status: SHIPPED | OUTPATIENT
Start: 2023-12-12

## 2023-12-12 RX ORDER — LISINOPRIL 20 MG/1
20 TABLET ORAL DAILY
Qty: 90 TABLET | Refills: 1 | Status: SHIPPED | OUTPATIENT
Start: 2023-12-12

## 2023-12-12 RX ORDER — ATORVASTATIN CALCIUM 80 MG/1
80 TABLET, FILM COATED ORAL DAILY
Qty: 90 TABLET | Refills: 1 | Status: SHIPPED | OUTPATIENT
Start: 2023-12-12

## 2023-12-12 RX ORDER — FLUTICASONE PROPIONATE 50 MCG
2 SPRAY, SUSPENSION (ML) NASAL DAILY
Qty: 1 EACH | Refills: 5 | Status: SHIPPED | OUTPATIENT
Start: 2023-12-12

## 2023-12-12 RX ORDER — LEVOTHYROXINE SODIUM 137 UG/1
137 TABLET ORAL
Qty: 90 TABLET | Refills: 1 | Status: SHIPPED | OUTPATIENT
Start: 2023-12-12

## 2023-12-12 RX ORDER — DULOXETIN HYDROCHLORIDE 30 MG/1
30 CAPSULE, DELAYED RELEASE ORAL DAILY
Qty: 90 CAPSULE | Refills: 1 | Status: SHIPPED | OUTPATIENT
Start: 2023-12-12

## 2023-12-12 RX ORDER — EZETIMIBE 10 MG/1
10 TABLET ORAL DAILY
Qty: 90 TABLET | Refills: 1 | Status: SHIPPED | OUTPATIENT
Start: 2023-12-12

## 2023-12-12 SDOH — ECONOMIC STABILITY: HOUSING INSECURITY
IN THE LAST 12 MONTHS, WAS THERE A TIME WHEN YOU DID NOT HAVE A STEADY PLACE TO SLEEP OR SLEPT IN A SHELTER (INCLUDING NOW)?: NO

## 2023-12-12 SDOH — ECONOMIC STABILITY: INCOME INSECURITY: HOW HARD IS IT FOR YOU TO PAY FOR THE VERY BASICS LIKE FOOD, HOUSING, MEDICAL CARE, AND HEATING?: NOT HARD AT ALL

## 2023-12-12 SDOH — ECONOMIC STABILITY: FOOD INSECURITY: WITHIN THE PAST 12 MONTHS, YOU WORRIED THAT YOUR FOOD WOULD RUN OUT BEFORE YOU GOT MONEY TO BUY MORE.: NEVER TRUE

## 2023-12-12 SDOH — ECONOMIC STABILITY: FOOD INSECURITY: WITHIN THE PAST 12 MONTHS, THE FOOD YOU BOUGHT JUST DIDN'T LAST AND YOU DIDN'T HAVE MONEY TO GET MORE.: NEVER TRUE

## 2023-12-12 ASSESSMENT — PATIENT HEALTH QUESTIONNAIRE - PHQ9
SUM OF ALL RESPONSES TO PHQ QUESTIONS 1-9: 2
SUM OF ALL RESPONSES TO PHQ QUESTIONS 1-9: 2
SUM OF ALL RESPONSES TO PHQ QUESTIONS 1-9: 0
2. FEELING DOWN, DEPRESSED OR HOPELESS: 0
SUM OF ALL RESPONSES TO PHQ QUESTIONS 1-9: 0
1. LITTLE INTEREST OR PLEASURE IN DOING THINGS: 1
SUM OF ALL RESPONSES TO PHQ9 QUESTIONS 1 & 2: 0
2. FEELING DOWN, DEPRESSED OR HOPELESS: 1
1. LITTLE INTEREST OR PLEASURE IN DOING THINGS: 0
SUM OF ALL RESPONSES TO PHQ QUESTIONS 1-9: 0
SUM OF ALL RESPONSES TO PHQ QUESTIONS 1-9: 2
SUM OF ALL RESPONSES TO PHQ QUESTIONS 1-9: 0
SUM OF ALL RESPONSES TO PHQ9 QUESTIONS 1 & 2: 2
SUM OF ALL RESPONSES TO PHQ QUESTIONS 1-9: 2

## 2023-12-12 ASSESSMENT — ENCOUNTER SYMPTOMS
BACK PAIN: 0
NAUSEA: 0
SHORTNESS OF BREATH: 0
VOMITING: 0
COUGH: 0

## 2023-12-12 NOTE — PROGRESS NOTES
Chief Complaint   Patient presents with    Hypertension    Follow-up Chronic Condition    Thyroid Problem    Cholesterol Problem    Depression    Anemia    Chronic Pain    Arm Pain    Otalgia       1. \"Have you been to the ER, urgent care clinic since your last visit? Hospitalized since your last visit? \" no    2. \"Have you seen or consulted any other health care providers outside of the [address] [place] [address] since your last visit? \"  no      Mammogram ?? no    Left arm pain    Ear pain
sounds. Pulmonary:      Effort: Pulmonary effort is normal.      Breath sounds: Normal breath sounds. Abdominal:      General: Abdomen is flat. Bowel sounds are normal.      Palpations: Abdomen is soft. Musculoskeletal:      Left shoulder: Decreased range of motion. Cervical back: Normal range of motion and neck supple. Comments: Right shoulder: Hawkin's Neer positive    Skin:     General: Skin is warm and dry. Neurological:      General: No focal deficit present. Mental Status: She is alert and oriented to person, place, and time. Psychiatric:         Mood and Affect: Mood normal.         Behavior: Behavior normal.         Judgment: Judgment normal.           ASSESSMENT and Danielbridge Phil was seen today for hypertension, follow-up chronic condition, thyroid problem, cholesterol problem, depression, anemia, chronic pain, arm pain and otalgia. Diagnoses and all orders for this visit:    Labs have already been ordered. Mixed hyperlipidemia  Pt will have updated lab work performed during today's OV. Pt requests a refill of their medication, which I have granted. -     atorvastatin (LIPITOR) 80 MG tablet; Take 1 tablet by mouth daily  -     ezetimibe (ZETIA) 10 MG tablet; Take 1 tablet by mouth daily    Acquired hypothyroidism  Pt will continue current regimen. Pt requests a refill of their medication, which I have granted. Pt will have updated lab work performed during today's OV. -     levothyroxine (SYNTHROID) 137 MCG tablet; Take 1 tablet by mouth every morning (before breakfast)    Essential (primary) hypertension  Stable. Pt will continue current regimen. Pt requests a refill of their medication, which I have granted. Pt will have updated lab work performed during today's OV. -     lisinopril (PRINIVIL;ZESTRIL) 20 MG tablet; Take 1 tablet by mouth daily    Vitamin D deficiency  Pt will continue current regimen. Pt requests a refill of their medication, which I have granted.  Pt

## 2023-12-13 ENCOUNTER — TELEPHONE (OUTPATIENT)
Age: 63
End: 2023-12-13

## 2023-12-13 DIAGNOSIS — G89.29 CHRONIC LEFT SHOULDER PAIN: Primary | ICD-10-CM

## 2023-12-13 DIAGNOSIS — M25.512 CHRONIC LEFT SHOULDER PAIN: Primary | ICD-10-CM

## 2023-12-13 LAB
25(OH)D3 SERPL-MCNC: <9 NG/ML (ref 30–100)
ALBUMIN SERPL-MCNC: 4.4 G/DL (ref 3.5–5)
ALBUMIN/GLOB SERPL: 1.2 (ref 1.1–2.2)
ALP SERPL-CCNC: 64 U/L (ref 45–117)
ALT SERPL-CCNC: 21 U/L (ref 12–78)
ANION GAP SERPL CALC-SCNC: 6 MMOL/L (ref 5–15)
AST SERPL-CCNC: 22 U/L (ref 15–37)
BILIRUB SERPL-MCNC: 0.4 MG/DL (ref 0.2–1)
BUN SERPL-MCNC: 8 MG/DL (ref 6–20)
BUN/CREAT SERPL: 8 (ref 12–20)
CALCIUM SERPL-MCNC: 9 MG/DL (ref 8.5–10.1)
CHLORIDE SERPL-SCNC: 106 MMOL/L (ref 97–108)
CHOLEST SERPL-MCNC: 166 MG/DL
CO2 SERPL-SCNC: 29 MMOL/L (ref 21–32)
CREAT SERPL-MCNC: 0.97 MG/DL (ref 0.55–1.02)
ERYTHROCYTE [DISTWIDTH] IN BLOOD BY AUTOMATED COUNT: 15.2 % (ref 11.5–14.5)
GLOBULIN SER CALC-MCNC: 3.6 G/DL (ref 2–4)
GLUCOSE SERPL-MCNC: 91 MG/DL (ref 65–100)
HCT VFR BLD AUTO: 44.3 % (ref 35–47)
HCV AB SER IA-ACNC: 0.07 INDEX
HCV AB SERPL QL IA: NONREACTIVE
HDLC SERPL-MCNC: 63 MG/DL
HDLC SERPL: 2.6 (ref 0–5)
HGB BLD-MCNC: 14.3 G/DL (ref 11.5–16)
HIV 1+2 AB+HIV1 P24 AG SERPL QL IA: NONREACTIVE
HIV 1/2 RESULT COMMENT: NORMAL
LDLC SERPL CALC-MCNC: 75.4 MG/DL (ref 0–100)
MCH RBC QN AUTO: 32.1 PG (ref 26–34)
MCHC RBC AUTO-ENTMCNC: 32.3 G/DL (ref 30–36.5)
MCV RBC AUTO: 99.6 FL (ref 80–99)
NRBC # BLD: 0 K/UL (ref 0–0.01)
NRBC BLD-RTO: 0 PER 100 WBC
PLATELET # BLD AUTO: 319 K/UL (ref 150–400)
PMV BLD AUTO: 10.4 FL (ref 8.9–12.9)
POTASSIUM SERPL-SCNC: 3.1 MMOL/L (ref 3.5–5.1)
PROT SERPL-MCNC: 8 G/DL (ref 6.4–8.2)
RBC # BLD AUTO: 4.45 M/UL (ref 3.8–5.2)
SODIUM SERPL-SCNC: 141 MMOL/L (ref 136–145)
TRIGL SERPL-MCNC: 138 MG/DL
TSH SERPL DL<=0.05 MIU/L-ACNC: 2.77 UIU/ML (ref 0.36–3.74)
VLDLC SERPL CALC-MCNC: 27.6 MG/DL
WBC # BLD AUTO: 6.4 K/UL (ref 3.6–11)

## 2023-12-13 NOTE — TELEPHONE ENCOUNTER
Pt called requesting a call back about the pain she is experiencing with her left arm or shoulder. Pt says she's following up to see if PCP sent pain medication to the right pharmacy Best call back number [phone]

## 2023-12-14 DIAGNOSIS — M25.512 ACUTE PAIN OF LEFT SHOULDER: Primary | ICD-10-CM

## 2023-12-18 ENCOUNTER — TELEPHONE (OUTPATIENT)
Age: 63
End: 2023-12-18

## 2023-12-18 NOTE — TELEPHONE ENCOUNTER
Patient called stating that she has been having pain in her shoulders and arm, and had an understanding that Dr. Serrano would be sending in medication for this pain. Patient did not receive this medication when she got the rest of her prescriptions. Patient is hoping to get a call back when this medication has been sent in.    Best call back number  [phone]

## 2023-12-20 RX ORDER — MELOXICAM 7.5 MG/1
7.5 TABLET ORAL DAILY
Qty: 30 TABLET | Refills: 1 | Status: SHIPPED | OUTPATIENT
Start: 2023-12-20

## 2024-01-19 LAB — NONINV COLON CA DNA+OCC BLD SCRN STL QL: POSITIVE

## 2024-03-08 ENCOUNTER — TELEPHONE (OUTPATIENT)
Age: 64
End: 2024-03-08

## 2024-03-08 NOTE — TELEPHONE ENCOUNTER
Patient wanted to inform Dr. Serrano of mothers passing (Lynne Parr), as she was a patient of his as well. Patient states that she passed on 2.23.24.    Best call back number  [phone]

## 2024-03-11 DIAGNOSIS — Z12.11 COLON CANCER SCREENING: Primary | ICD-10-CM

## 2024-05-02 ENCOUNTER — TELEPHONE (OUTPATIENT)
Age: 64
End: 2024-05-02

## 2024-05-02 NOTE — TELEPHONE ENCOUNTER
[phone] (home)     Spoke to patient to schedule mammogram but declined will call back when ready

## 2024-08-27 DIAGNOSIS — F41.1 GENERALIZED ANXIETY DISORDER: ICD-10-CM

## 2024-08-27 DIAGNOSIS — E03.9 ACQUIRED HYPOTHYROIDISM: ICD-10-CM

## 2024-08-27 DIAGNOSIS — I10 ESSENTIAL (PRIMARY) HYPERTENSION: ICD-10-CM

## 2024-08-27 DIAGNOSIS — G89.29 CHRONIC LEFT SHOULDER PAIN: ICD-10-CM

## 2024-08-27 DIAGNOSIS — M25.512 CHRONIC LEFT SHOULDER PAIN: ICD-10-CM

## 2024-08-27 DIAGNOSIS — E78.2 MIXED HYPERLIPIDEMIA: ICD-10-CM

## 2024-08-27 DIAGNOSIS — E55.9 VITAMIN D DEFICIENCY: ICD-10-CM

## 2024-08-27 DIAGNOSIS — J30.1 ALLERGIC RHINITIS DUE TO POLLEN, UNSPECIFIED SEASONALITY: ICD-10-CM

## 2024-08-27 RX ORDER — LISINOPRIL 20 MG/1
20 TABLET ORAL DAILY
Qty: 30 TABLET | Refills: 0 | Status: SHIPPED | OUTPATIENT
Start: 2024-08-27

## 2024-08-27 RX ORDER — ATORVASTATIN CALCIUM 80 MG/1
80 TABLET, FILM COATED ORAL DAILY
Qty: 30 TABLET | Refills: 0 | Status: SHIPPED | OUTPATIENT
Start: 2024-08-27

## 2024-08-27 RX ORDER — EZETIMIBE 10 MG/1
10 TABLET ORAL DAILY
Qty: 30 TABLET | Refills: 0 | Status: SHIPPED | OUTPATIENT
Start: 2024-08-27

## 2024-08-27 RX ORDER — DULOXETIN HYDROCHLORIDE 30 MG/1
30 CAPSULE, DELAYED RELEASE ORAL DAILY
Qty: 30 CAPSULE | Refills: 0 | Status: SHIPPED | OUTPATIENT
Start: 2024-08-27

## 2024-08-27 RX ORDER — LEVOTHYROXINE SODIUM 137 UG/1
137 TABLET ORAL
Qty: 30 TABLET | Refills: 0 | Status: SHIPPED | OUTPATIENT
Start: 2024-08-27

## 2024-08-27 RX ORDER — MELOXICAM 7.5 MG/1
7.5 TABLET ORAL DAILY
Qty: 30 TABLET | Refills: 0 | Status: SHIPPED | OUTPATIENT
Start: 2024-08-27

## 2024-08-27 RX ORDER — FLUTICASONE PROPIONATE 50 MCG
2 SPRAY, SUSPENSION (ML) NASAL DAILY
Qty: 1 EACH | Refills: 0 | Status: SHIPPED | OUTPATIENT
Start: 2024-08-27

## 2024-08-27 RX ORDER — FEXOFENADINE HCL 180 MG/1
180 TABLET ORAL DAILY
Qty: 30 TABLET | Refills: 0 | Status: SHIPPED | OUTPATIENT
Start: 2024-08-27

## 2024-08-27 NOTE — TELEPHONE ENCOUNTER
Patient called stating that she is need ALL of her medication refilled. Patient is hoping to get all of her medication refilled, as she is all out of it. Patient is also hoping if there is any way to get an appointment with Dr. Serrano anytime soon. Patient is just wanting to do a follow up, so she is able to make sure everything is okay with her health. Patient is also hoping that we can send all of her medication to [facility].    Best call back number  [phone]

## 2024-08-27 NOTE — TELEPHONE ENCOUNTER
TC to Patient ID verified Patient lov 12/2023  Wants meds refilled was suppose to f/u in 6 months never did.  She is beng insistent she has come in for a follow up but nothing on record.    Was suppose to come in for repeat Potassium never did.     Patient never scheduled Colonoscopy advises the GI office never called her so she left it at that.     Advises her ear is still bothering her since her last visit.     Patient seems agitated on phone.     Mother passed and she has not elizabeth right since

## 2024-08-30 RX ORDER — DULOXETIN HYDROCHLORIDE 30 MG/1
30 CAPSULE, DELAYED RELEASE ORAL DAILY
Qty: 30 CAPSULE | Refills: 0 | OUTPATIENT
Start: 2024-08-30

## 2024-08-30 RX ORDER — ATORVASTATIN CALCIUM 80 MG/1
TABLET, FILM COATED ORAL
Qty: 30 TABLET | Refills: 0 | OUTPATIENT
Start: 2024-08-30

## 2024-08-30 RX ORDER — LISINOPRIL 20 MG/1
20 TABLET ORAL DAILY
Qty: 30 TABLET | Refills: 0 | OUTPATIENT
Start: 2024-08-30

## 2024-08-30 RX ORDER — MELOXICAM 7.5 MG/1
7.5 TABLET ORAL DAILY
Qty: 30 TABLET | Refills: 0 | OUTPATIENT
Start: 2024-08-30

## 2024-08-30 RX ORDER — LEVOTHYROXINE SODIUM 137 UG/1
137 TABLET ORAL
Qty: 30 TABLET | Refills: 0 | OUTPATIENT
Start: 2024-08-30

## 2024-08-30 RX ORDER — EZETIMIBE 10 MG/1
10 TABLET ORAL DAILY
Qty: 30 TABLET | Refills: 0 | OUTPATIENT
Start: 2024-08-30

## 2024-09-03 ENCOUNTER — OFFICE VISIT (OUTPATIENT)
Age: 64
End: 2024-09-03
Payer: MEDICARE

## 2024-09-03 VITALS
SYSTOLIC BLOOD PRESSURE: 118 MMHG | DIASTOLIC BLOOD PRESSURE: 82 MMHG | WEIGHT: 177.6 LBS | TEMPERATURE: 97.8 F | BODY MASS INDEX: 27.88 KG/M2 | RESPIRATION RATE: 18 BRPM | HEIGHT: 67 IN | OXYGEN SATURATION: 99 % | HEART RATE: 74 BPM

## 2024-09-03 DIAGNOSIS — Z12.31 VISIT FOR SCREENING MAMMOGRAM: ICD-10-CM

## 2024-09-03 DIAGNOSIS — Z72.0 TOBACCO ABUSE: ICD-10-CM

## 2024-09-03 DIAGNOSIS — Z23 IMMUNIZATION DUE: ICD-10-CM

## 2024-09-03 DIAGNOSIS — F33.1 MAJOR DEPRESSIVE DISORDER, RECURRENT, MODERATE (HCC): ICD-10-CM

## 2024-09-03 DIAGNOSIS — E78.2 MIXED HYPERLIPIDEMIA: ICD-10-CM

## 2024-09-03 DIAGNOSIS — I10 ESSENTIAL (PRIMARY) HYPERTENSION: Primary | ICD-10-CM

## 2024-09-03 DIAGNOSIS — E03.9 ACQUIRED HYPOTHYROIDISM: ICD-10-CM

## 2024-09-03 DIAGNOSIS — E55.9 VITAMIN D DEFICIENCY: ICD-10-CM

## 2024-09-03 DIAGNOSIS — Z00.00 ENCOUNTER FOR SUBSEQUENT ANNUAL WELLNESS VISIT (AWV) IN MEDICARE PATIENT: ICD-10-CM

## 2024-09-03 LAB
25(OH)D3 SERPL-MCNC: 30.5 NG/ML (ref 30–100)
ALBUMIN SERPL-MCNC: 3.9 G/DL (ref 3.5–5)
ALBUMIN/GLOB SERPL: 1 (ref 1.1–2.2)
ALP SERPL-CCNC: 73 U/L (ref 45–117)
ALT SERPL-CCNC: 30 U/L (ref 12–78)
ANION GAP SERPL CALC-SCNC: 6 MMOL/L (ref 5–15)
AST SERPL-CCNC: 24 U/L (ref 15–37)
BASOPHILS # BLD: 0 K/UL (ref 0–0.1)
BASOPHILS NFR BLD: 1 % (ref 0–1)
BILIRUB SERPL-MCNC: 0.3 MG/DL (ref 0.2–1)
BUN SERPL-MCNC: 15 MG/DL (ref 6–20)
BUN/CREAT SERPL: 13 (ref 12–20)
CALCIUM SERPL-MCNC: 9.6 MG/DL (ref 8.5–10.1)
CHLORIDE SERPL-SCNC: 107 MMOL/L (ref 97–108)
CHOLEST SERPL-MCNC: 207 MG/DL
CO2 SERPL-SCNC: 27 MMOL/L (ref 21–32)
CREAT SERPL-MCNC: 1.12 MG/DL (ref 0.55–1.02)
DIFFERENTIAL METHOD BLD: ABNORMAL
EOSINOPHIL # BLD: 0.2 K/UL (ref 0–0.4)
EOSINOPHIL NFR BLD: 3 % (ref 0–7)
ERYTHROCYTE [DISTWIDTH] IN BLOOD BY AUTOMATED COUNT: 15.1 % (ref 11.5–14.5)
GLOBULIN SER CALC-MCNC: 3.8 G/DL (ref 2–4)
GLUCOSE SERPL-MCNC: 118 MG/DL (ref 65–100)
HCT VFR BLD AUTO: 44.5 % (ref 35–47)
HDLC SERPL-MCNC: 74 MG/DL
HDLC SERPL: 2.8 (ref 0–5)
HGB BLD-MCNC: 14.7 G/DL (ref 11.5–16)
IMM GRANULOCYTES # BLD AUTO: 0 K/UL (ref 0–0.04)
IMM GRANULOCYTES NFR BLD AUTO: 0 % (ref 0–0.5)
LDLC SERPL CALC-MCNC: 106 MG/DL (ref 0–100)
LYMPHOCYTES # BLD: 2.2 K/UL (ref 0.8–3.5)
LYMPHOCYTES NFR BLD: 35 % (ref 12–49)
MAGNESIUM SERPL-MCNC: 1.8 MG/DL (ref 1.6–2.4)
MCH RBC QN AUTO: 32 PG (ref 26–34)
MCHC RBC AUTO-ENTMCNC: 33 G/DL (ref 30–36.5)
MCV RBC AUTO: 96.7 FL (ref 80–99)
MONOCYTES # BLD: 0.6 K/UL (ref 0–1)
MONOCYTES NFR BLD: 9 % (ref 5–13)
NEUTS SEG # BLD: 3.2 K/UL (ref 1.8–8)
NEUTS SEG NFR BLD: 52 % (ref 32–75)
NRBC # BLD: 0 K/UL (ref 0–0.01)
NRBC BLD-RTO: 0 PER 100 WBC
PLATELET # BLD AUTO: 270 K/UL (ref 150–400)
PMV BLD AUTO: 10.6 FL (ref 8.9–12.9)
POTASSIUM SERPL-SCNC: 3.2 MMOL/L (ref 3.5–5.1)
PROT SERPL-MCNC: 7.7 G/DL (ref 6.4–8.2)
RBC # BLD AUTO: 4.6 M/UL (ref 3.8–5.2)
SODIUM SERPL-SCNC: 140 MMOL/L (ref 136–145)
TRIGL SERPL-MCNC: 135 MG/DL
TSH SERPL DL<=0.05 MIU/L-ACNC: 2.82 UIU/ML (ref 0.36–3.74)
VLDLC SERPL CALC-MCNC: 27 MG/DL
WBC # BLD AUTO: 6.1 K/UL (ref 3.6–11)

## 2024-09-03 PROCEDURE — 99214 OFFICE O/P EST MOD 30 MIN: CPT | Performed by: FAMILY MEDICINE

## 2024-09-03 RX ORDER — BUPROPION HYDROCHLORIDE 150 MG/1
150 TABLET, FILM COATED, EXTENDED RELEASE ORAL 2 TIMES DAILY
Qty: 180 TABLET | Refills: 1 | Status: SHIPPED | OUTPATIENT
Start: 2024-09-03

## 2024-09-03 RX ORDER — ZOSTER VACCINE RECOMBINANT, ADJUVANTED 50 MCG/0.5
0.5 KIT INTRAMUSCULAR SEE ADMIN INSTRUCTIONS
Qty: 0.5 ML | Refills: 1 | Status: SHIPPED | OUTPATIENT
Start: 2024-09-03 | End: 2025-03-02

## 2024-09-03 ASSESSMENT — LIFESTYLE VARIABLES: HOW OFTEN DO YOU HAVE A DRINK CONTAINING ALCOHOL: NEVER

## 2024-09-03 ASSESSMENT — PATIENT HEALTH QUESTIONNAIRE - PHQ9
1. LITTLE INTEREST OR PLEASURE IN DOING THINGS: NOT AT ALL
2. FEELING DOWN, DEPRESSED OR HOPELESS: NEARLY EVERY DAY
9. THOUGHTS THAT YOU WOULD BE BETTER OFF DEAD, OR OF HURTING YOURSELF: NOT AT ALL
SUM OF ALL RESPONSES TO PHQ QUESTIONS 1-9: 10
SUM OF ALL RESPONSES TO PHQ QUESTIONS 1-9: 10
5. POOR APPETITE OR OVEREATING: NEARLY EVERY DAY
10. IF YOU CHECKED OFF ANY PROBLEMS, HOW DIFFICULT HAVE THESE PROBLEMS MADE IT FOR YOU TO DO YOUR WORK, TAKE CARE OF THINGS AT HOME, OR GET ALONG WITH OTHER PEOPLE: SOMEWHAT DIFFICULT
SUM OF ALL RESPONSES TO PHQ9 QUESTIONS 1 & 2: 3
SUM OF ALL RESPONSES TO PHQ QUESTIONS 1-9: 10
8. MOVING OR SPEAKING SO SLOWLY THAT OTHER PEOPLE COULD HAVE NOTICED. OR THE OPPOSITE, BEING SO FIGETY OR RESTLESS THAT YOU HAVE BEEN MOVING AROUND A LOT MORE THAN USUAL: NOT AT ALL
SUM OF ALL RESPONSES TO PHQ QUESTIONS 1-9: 10
3. TROUBLE FALLING OR STAYING ASLEEP: NOT AT ALL
4. FEELING TIRED OR HAVING LITTLE ENERGY: NEARLY EVERY DAY
6. FEELING BAD ABOUT YOURSELF - OR THAT YOU ARE A FAILURE OR HAVE LET YOURSELF OR YOUR FAMILY DOWN: NOT AT ALL
7. TROUBLE CONCENTRATING ON THINGS, SUCH AS READING THE NEWSPAPER OR WATCHING TELEVISION: SEVERAL DAYS

## 2024-09-05 ENCOUNTER — TELEMEDICINE (OUTPATIENT)
Age: 64
End: 2024-09-05
Payer: MEDICARE

## 2024-09-05 DIAGNOSIS — F33.1 MODERATE EPISODE OF RECURRENT MAJOR DEPRESSIVE DISORDER (HCC): Primary | ICD-10-CM

## 2024-09-05 PROCEDURE — 90791 PSYCH DIAGNOSTIC EVALUATION: CPT | Performed by: SOCIAL WORKER

## 2024-09-05 NOTE — PSYCHOTHERAPY
Virtual Visit (At Home) - Initial Evaluation    Time Start:10:00 am  Time End:10:56 am    DX:    Diagnosis Orders   1. Moderate episode of recurrent major depressive disorder (HCC)                 Met with Ms. Barnett 9/5/2024 for our first appointment.  This patient was referred due to numerous losses in her life.  She recently lost her mother whom she was providing 24/7 365 day care.  Her mother had dementia, had a stroke, pneumonia and COVID that required full care.  Although Ms. Barnett has family in the area, no one stepped up to help her with her mother.  Ms. Barnett admits there were many times when she would \"hide under the sheets and cry.\"  Other losses include her  in 2005, her sister in 2007, her father also in 2007, her brother in 2019 and then another sister in 2021.  She now only has one sister that is alive and they don't get along.  Ms. Barnett grew up in [place] with her parents.  She was very close to her parents. 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  Due to the numerous losses, she feels she has \"come undone.\"  With all the losses she is tired, sad and emotionally drained.  She does not report any acute symptoms but wishes she had support herself.  The patient started getting disability in 2013 after being dx with thyroid cancer.  The patient's current symptoms include:  over and under eating, poor sleep, not wanting to get out of bed some days, not caring about her appearance and lack of motivation.  She would like to talk to someone about these losses and start to \"live again.\"      Lilian Barnett is a 63 y.o. female who is alert and oriented X3.  she  does not have any suicidal or homicidal ideations.  Patient is not psychotic or delusional.   she has not been psychiatrically admitted to a hospital. Ms. Barnett does have good eye contact during this interview. @ HE is  verbal and engaging.  Patient

## 2024-09-05 NOTE — PROGRESS NOTES
Session Time     Start Time:    10:00 am  Finish Time:  10:56 am    Total time spent for this encounter:  56 minutes    We did set a follow-up appointment with this clinician.      Follow up:  Sept 19 @ 2 VV.    Therapy Modalities   Stress Management and grief and loss     Assessment / Plan     Lilian Barnett is a 63 y.o. female who is alert and oriented X3.  She does not have any suicidal or homicidal ideations.  Patient is not psychotic or delusional.   She has not been psychiatrically admitted to a hospital. Ms. Barnett does have good eye contact during this interview. She is  verbal and engaging.  Patient does have good insight and judgement.  She is  a good candidate for short term therapy to address the above issues.      Psychotherapy Target Symptoms:  change in daily functioning , decreased ADLs , depressed mood, poor sleep, and poor self care    Assessment:   initial assessment    Plan:  continue psychotherapy    1. Moderate episode of recurrent major depressive disorder (HCC)       Lilian Barnett, was evaluated through a synchronous (real-time) audio-video encounter. The patient (or guardian if applicable) is aware that this is a billable service, which includes applicable co-pays. This Virtual Visit was conducted with patient's (and/or legal guardian's) consent. Patient identification was verified, and a caregiver was present when appropriate.   The patient was located at Home: [address] [place] [id number]  Provider was located at Home [place]Appt [place]  Confirm you are appropriately licensed, registered, or certified to deliver care in the state where the patient is located as indicated above. If you are not or unsure, please re-schedule the visit: Yes, I confirm.        --Shae Condon LCSW on 9/5/2024 at 3:18 PM    An electronic signature was used to authenticate this note.

## 2024-09-19 ENCOUNTER — TELEMEDICINE (OUTPATIENT)
Age: 64
End: 2024-09-19
Payer: MEDICARE

## 2024-09-19 DIAGNOSIS — F33.1 MODERATE EPISODE OF RECURRENT MAJOR DEPRESSIVE DISORDER (HCC): Primary | ICD-10-CM

## 2024-09-19 PROCEDURE — 90837 PSYTX W PT 60 MINUTES: CPT | Performed by: SOCIAL WORKER

## 2024-09-27 DIAGNOSIS — M25.512 CHRONIC LEFT SHOULDER PAIN: ICD-10-CM

## 2024-09-27 DIAGNOSIS — F41.1 GENERALIZED ANXIETY DISORDER: ICD-10-CM

## 2024-09-27 DIAGNOSIS — G89.29 CHRONIC LEFT SHOULDER PAIN: ICD-10-CM

## 2024-09-27 DIAGNOSIS — E78.2 MIXED HYPERLIPIDEMIA: ICD-10-CM

## 2024-09-27 DIAGNOSIS — I10 ESSENTIAL (PRIMARY) HYPERTENSION: ICD-10-CM

## 2024-09-27 DIAGNOSIS — E03.9 ACQUIRED HYPOTHYROIDISM: ICD-10-CM

## 2024-09-27 DIAGNOSIS — J30.1 ALLERGIC RHINITIS DUE TO POLLEN, UNSPECIFIED SEASONALITY: ICD-10-CM

## 2024-09-29 RX ORDER — ATORVASTATIN CALCIUM 80 MG/1
80 TABLET, FILM COATED ORAL DAILY
Qty: 90 TABLET | Refills: 1 | Status: SHIPPED | OUTPATIENT
Start: 2024-09-29

## 2024-09-29 RX ORDER — MELOXICAM 7.5 MG/1
7.5 TABLET ORAL DAILY
Qty: 90 TABLET | Refills: 1 | Status: SHIPPED | OUTPATIENT
Start: 2024-09-29

## 2024-09-29 RX ORDER — EZETIMIBE 10 MG/1
10 TABLET ORAL DAILY
Qty: 90 TABLET | Refills: 1 | Status: SHIPPED | OUTPATIENT
Start: 2024-09-29

## 2024-09-29 RX ORDER — DULOXETIN HYDROCHLORIDE 30 MG/1
30 CAPSULE, DELAYED RELEASE ORAL DAILY
Qty: 90 CAPSULE | Refills: 1 | Status: SHIPPED | OUTPATIENT
Start: 2024-09-29

## 2024-09-29 RX ORDER — LEVOTHYROXINE SODIUM 137 UG/1
137 TABLET ORAL
Qty: 90 TABLET | Refills: 1 | Status: SHIPPED | OUTPATIENT
Start: 2024-09-29

## 2024-09-29 RX ORDER — FLUTICASONE PROPIONATE 50 MCG
2 SPRAY, SUSPENSION (ML) NASAL DAILY
Qty: 16 G | Refills: 5 | Status: SHIPPED | OUTPATIENT
Start: 2024-09-29

## 2024-09-29 RX ORDER — LISINOPRIL 20 MG/1
20 TABLET ORAL DAILY
Qty: 90 TABLET | Refills: 1 | Status: SHIPPED | OUTPATIENT
Start: 2024-09-29

## 2024-10-07 ENCOUNTER — TELEMEDICINE (OUTPATIENT)
Age: 64
End: 2024-10-07
Payer: MEDICARE

## 2024-10-07 DIAGNOSIS — E87.6 HYPOKALEMIA: ICD-10-CM

## 2024-10-07 DIAGNOSIS — F33.1 MAJOR DEPRESSIVE DISORDER, RECURRENT, MODERATE (HCC): ICD-10-CM

## 2024-10-07 DIAGNOSIS — F41.1 GENERALIZED ANXIETY DISORDER: Primary | ICD-10-CM

## 2024-10-07 PROCEDURE — 99214 OFFICE O/P EST MOD 30 MIN: CPT | Performed by: FAMILY MEDICINE

## 2024-10-07 PROCEDURE — G8428 CUR MEDS NOT DOCUMENT: HCPCS | Performed by: FAMILY MEDICINE

## 2024-10-07 PROCEDURE — 3017F COLORECTAL CA SCREEN DOC REV: CPT | Performed by: FAMILY MEDICINE

## 2024-10-07 ASSESSMENT — ENCOUNTER SYMPTOMS
COUGH: 0
NAUSEA: 0
SHORTNESS OF BREATH: 0
BACK PAIN: 0
VOMITING: 0

## 2024-10-07 ASSESSMENT — PATIENT HEALTH QUESTIONNAIRE - PHQ9
5. POOR APPETITE OR OVEREATING: NEARLY EVERY DAY
4. FEELING TIRED OR HAVING LITTLE ENERGY: NEARLY EVERY DAY
7. TROUBLE CONCENTRATING ON THINGS, SUCH AS READING THE NEWSPAPER OR WATCHING TELEVISION: SEVERAL DAYS
8. MOVING OR SPEAKING SO SLOWLY THAT OTHER PEOPLE COULD HAVE NOTICED. OR THE OPPOSITE, BEING SO FIGETY OR RESTLESS THAT YOU HAVE BEEN MOVING AROUND A LOT MORE THAN USUAL: NOT AT ALL
3. TROUBLE FALLING OR STAYING ASLEEP: NEARLY EVERY DAY
SUM OF ALL RESPONSES TO PHQ QUESTIONS 1-9: 11
SUM OF ALL RESPONSES TO PHQ QUESTIONS 1-9: 11
SUM OF ALL RESPONSES TO PHQ9 QUESTIONS 1 & 2: 1
6. FEELING BAD ABOUT YOURSELF - OR THAT YOU ARE A FAILURE OR HAVE LET YOURSELF OR YOUR FAMILY DOWN: NOT AT ALL
1. LITTLE INTEREST OR PLEASURE IN DOING THINGS: SEVERAL DAYS
2. FEELING DOWN, DEPRESSED OR HOPELESS: NOT AT ALL
9. THOUGHTS THAT YOU WOULD BE BETTER OFF DEAD, OR OF HURTING YOURSELF: NOT AT ALL
SUM OF ALL RESPONSES TO PHQ QUESTIONS 1-9: 11
SUM OF ALL RESPONSES TO PHQ QUESTIONS 1-9: 11

## 2024-10-07 NOTE — PROGRESS NOTES
Lilian Barnett is a 63 y.o. female who was seen by synchronous (real-time) audio-video technology on 10/7/2024.      Consent:  Services were provided through a video synchronous discussion virtually to substitute for in-person appointment.   She and/or her healthcare decision maker is aware that this patient-initiated Telehealth encounter is a billable service, with coverage as determined by her insurance carrier. She is aware that she may receive a bill and has provided verbal consent to proceed: Yes    I was in the office while conducting this encounter.    Subjective:   Lilian Banrett was seen for follow up on chronic conditions.     Depression: Pt is taking Cymbalta 30mg daily and Zyban 150mg BID. She reports overall doing better. She has \"good days and bad days.\" She stays busy caring for her dog and plants. She sometimes feels lonely. She doesn't always sleep well and sometimes overeats.    Hypertension: Pt continues with lisinopril 20mg daily.     Hyperlipidemia: Lipid panel on 9/3/24 notable for total cholesterol 207, HDL 74, , and triglycerides 135. Pt continues with Zetia 10mg daily and atorvastatin 80mg daily.     Hypothyroidism: Pt's TSH level was 2.82 on 9/3/24. Pt continues with levothyroxine 137mcg daily.     Vitamin D deficiency: Pt's vitamin D was 30.5 on 9/3/24. Pt continues with vitamin D 1000 units daily.      Prior to Admission medications    Medication Sig Start Date End Date Taking? Authorizing Provider   DULoxetine (CYMBALTA) 30 MG extended release capsule TAKE 1 CAPSULE BY MOUTH DAILY 9/29/24   Guillermo Serrano MD   ezetimibe (ZETIA) 10 MG tablet TAKE 1 TABLET BY MOUTH DAILY 9/29/24   Guillermo Serrano MD   levothyroxine (SYNTHROID) 137 MCG tablet TAKE 1 TABLET BY MOUTH EVERY MORNING (BEFORE BREAKFAST) 9/29/24   Guillermo Serrano MD   fluticasone (FLONASE) 50 MCG/ACT nasal spray 2 SPRAYS BY NASAL ROUTE DAILY 9/29/24   Guillermo Serrano MD   lisinopril (PRINIVIL;ZESTRIL) 20 MG

## 2024-12-09 DIAGNOSIS — Z72.0 TOBACCO ABUSE: ICD-10-CM

## 2024-12-11 RX ORDER — BUPROPION HYDROCHLORIDE 150 MG/1
150 TABLET, FILM COATED, EXTENDED RELEASE ORAL 2 TIMES DAILY
Qty: 180 TABLET | Refills: 1 | OUTPATIENT
Start: 2024-12-11

## 2024-12-27 DIAGNOSIS — F41.1 GENERALIZED ANXIETY DISORDER: ICD-10-CM

## 2024-12-27 DIAGNOSIS — J30.1 ALLERGIC RHINITIS DUE TO POLLEN, UNSPECIFIED SEASONALITY: ICD-10-CM

## 2024-12-27 DIAGNOSIS — M25.512 CHRONIC LEFT SHOULDER PAIN: ICD-10-CM

## 2024-12-27 DIAGNOSIS — E78.2 MIXED HYPERLIPIDEMIA: ICD-10-CM

## 2024-12-27 DIAGNOSIS — G89.29 CHRONIC LEFT SHOULDER PAIN: ICD-10-CM

## 2024-12-27 DIAGNOSIS — I10 ESSENTIAL (PRIMARY) HYPERTENSION: ICD-10-CM

## 2024-12-27 DIAGNOSIS — E03.9 ACQUIRED HYPOTHYROIDISM: ICD-10-CM

## 2024-12-27 RX ORDER — ATORVASTATIN CALCIUM 80 MG/1
TABLET, FILM COATED ORAL
Qty: 90 TABLET | Refills: 1 | OUTPATIENT
Start: 2024-12-27

## 2024-12-27 RX ORDER — DULOXETIN HYDROCHLORIDE 30 MG/1
30 CAPSULE, DELAYED RELEASE ORAL DAILY
Qty: 90 CAPSULE | Refills: 1 | OUTPATIENT
Start: 2024-12-27

## 2024-12-27 RX ORDER — LISINOPRIL 20 MG/1
20 TABLET ORAL DAILY
Qty: 90 TABLET | Refills: 1 | OUTPATIENT
Start: 2024-12-27

## 2024-12-27 RX ORDER — MELOXICAM 7.5 MG/1
7.5 TABLET ORAL DAILY
Qty: 90 TABLET | Refills: 1 | OUTPATIENT
Start: 2024-12-27

## 2024-12-27 RX ORDER — EZETIMIBE 10 MG/1
10 TABLET ORAL DAILY
Qty: 90 TABLET | Refills: 1 | OUTPATIENT
Start: 2024-12-27

## 2024-12-27 RX ORDER — LEVOTHYROXINE SODIUM 137 UG/1
137 TABLET ORAL
Qty: 90 TABLET | Refills: 1 | OUTPATIENT
Start: 2024-12-27

## 2024-12-27 RX ORDER — FLUTICASONE PROPIONATE 50 MCG
2 SPRAY, SUSPENSION (ML) NASAL DAILY
Qty: 48 G | OUTPATIENT
Start: 2024-12-27

## 2024-12-27 NOTE — TELEPHONE ENCOUNTER
Patient needs to contact Pharmacy she has refills       PCP: Guillermo Serrano MD    Last appt: 10/7/2024   Future Appointments   Date Time Provider Department Center   2025 11:15 AM Guillermo Serrano MD [facility]       Requested Prescriptions     Pending Prescriptions Disp Refills    meloxicam (MOBIC) 7.5 MG tablet [Pharmacy Med Name: MELOXICAM 7.5 MG ORAL TABLET] 90 tablet 1     Sig: TAKE 1 TABLET BY MOUTH DAILY    lisinopril (PRINIVIL;ZESTRIL) 20 MG tablet [Pharmacy Med Name: LISINOPRIL 20 MG ORAL TABLET] 90 tablet 1     Sig: TAKE 1 TABLET BY MOUTH DAILY    levothyroxine (SYNTHROID) 137 MCG tablet [Pharmacy Med Name: LEVOTHYROXINE SODIUM 137 MCG ORAL TABLET] 90 tablet 1     Sig: TAKE 1 TABLET BY MOUTH EVERY MORNING (BEFORE BREAKFAST)    fluticasone (FLONASE) 50 MCG/ACT nasal spray [Pharmacy Med Name: FLUTICASONE PROPIONATE 50 MCG/ACT NASAL SUSPENSION] 48 g      Si SPRAYS BY NASAL ROUTE DAILY    ezetimibe (ZETIA) 10 MG tablet [Pharmacy Med Name: EZETIMIBE 10 MG ORAL TABLET] 90 tablet 1     Sig: TAKE 1 TABLET BY MOUTH DAILY    DULoxetine (CYMBALTA) 30 MG extended release capsule [Pharmacy Med Name: DULOXETINE HCL 30 MG ORAL CAPSULE DELAYED RELEASE PARTICLES] 90 capsule 1     Sig: TAKE 1 CAPSULE BY MOUTH DAILY    atorvastatin (LIPITOR) 80 MG tablet [Pharmacy Med Name: ATORVASTATIN CALCIUM 80 MG ORAL TABLET] 90 tablet 1     Sig: TAKE 1 TABLET BY MOUTH NIGHTLY AT BEDTIME         Prior labs and Blood pressures:  BP Readings from Last 3 Encounters:   24 118/82   23 100/88     Lab Results   Component Value Date/Time     2024 02:29 PM    K 3.2 2024 02:29 PM     2024 02:29 PM    CO2 27 2024 02:29 PM    BUN 15 2024 02:29 PM    GFRAA 71 10/02/2019 11:32 AM     No results found for: \"HBA1C\", \"RYW7OHMU\"  Lab Results   Component Value Date/Time    CHOL 207 2024 02:29 PM    HDL 74 2024 02:29 PM     2024 02:29 PM    LDL 75.4 2023

## 2025-08-04 DIAGNOSIS — Z72.0 TOBACCO ABUSE: ICD-10-CM

## 2025-08-04 DIAGNOSIS — I10 ESSENTIAL (PRIMARY) HYPERTENSION: ICD-10-CM

## 2025-08-04 DIAGNOSIS — J30.1 ALLERGIC RHINITIS DUE TO POLLEN, UNSPECIFIED SEASONALITY: ICD-10-CM

## 2025-08-04 DIAGNOSIS — G89.29 CHRONIC LEFT SHOULDER PAIN: ICD-10-CM

## 2025-08-04 DIAGNOSIS — F41.1 GENERALIZED ANXIETY DISORDER: ICD-10-CM

## 2025-08-04 DIAGNOSIS — E03.9 ACQUIRED HYPOTHYROIDISM: ICD-10-CM

## 2025-08-04 DIAGNOSIS — E55.9 VITAMIN D DEFICIENCY: ICD-10-CM

## 2025-08-04 DIAGNOSIS — M25.512 CHRONIC LEFT SHOULDER PAIN: ICD-10-CM

## 2025-08-04 DIAGNOSIS — E78.2 MIXED HYPERLIPIDEMIA: ICD-10-CM

## 2025-08-04 RX ORDER — MELOXICAM 7.5 MG/1
7.5 TABLET ORAL DAILY
Qty: 90 TABLET | Refills: 0 | Status: SHIPPED | OUTPATIENT
Start: 2025-08-04

## 2025-08-04 RX ORDER — ATORVASTATIN CALCIUM 80 MG/1
80 TABLET, FILM COATED ORAL DAILY
Qty: 90 TABLET | Refills: 0 | Status: SHIPPED | OUTPATIENT
Start: 2025-08-04

## 2025-08-04 RX ORDER — FLUTICASONE PROPIONATE 50 MCG
2 SPRAY, SUSPENSION (ML) NASAL DAILY
Qty: 16 G | Refills: 2 | Status: SHIPPED | OUTPATIENT
Start: 2025-08-04

## 2025-08-04 RX ORDER — LISINOPRIL 20 MG/1
20 TABLET ORAL DAILY
Qty: 90 TABLET | Refills: 0 | Status: SHIPPED | OUTPATIENT
Start: 2025-08-04

## 2025-08-04 RX ORDER — EZETIMIBE 10 MG/1
10 TABLET ORAL DAILY
Qty: 90 TABLET | Refills: 0 | Status: SHIPPED | OUTPATIENT
Start: 2025-08-04

## 2025-08-04 RX ORDER — BUPROPION HYDROCHLORIDE 150 MG/1
150 TABLET, FILM COATED, EXTENDED RELEASE ORAL 2 TIMES DAILY
Qty: 180 TABLET | Refills: 0 | Status: SHIPPED | OUTPATIENT
Start: 2025-08-04

## 2025-08-04 RX ORDER — LEVOTHYROXINE SODIUM 137 UG/1
137 TABLET ORAL
Qty: 90 TABLET | Refills: 0 | Status: SHIPPED | OUTPATIENT
Start: 2025-08-04

## 2025-08-04 RX ORDER — DULOXETIN HYDROCHLORIDE 30 MG/1
30 CAPSULE, DELAYED RELEASE ORAL DAILY
Qty: 90 CAPSULE | Refills: 0 | Status: SHIPPED | OUTPATIENT
Start: 2025-08-04